# Patient Record
Sex: FEMALE | URBAN - METROPOLITAN AREA
[De-identification: names, ages, dates, MRNs, and addresses within clinical notes are randomized per-mention and may not be internally consistent; named-entity substitution may affect disease eponyms.]

---

## 2020-12-04 ENCOUNTER — INPATIENT (INPATIENT)
Facility: HOSPITAL | Age: 85
LOS: 6 days | End: 2020-12-11
Attending: HOSPITALIST | Admitting: HOSPITALIST
Payer: MEDICARE

## 2020-12-04 VITALS
TEMPERATURE: 98 F | SYSTOLIC BLOOD PRESSURE: 103 MMHG | DIASTOLIC BLOOD PRESSURE: 53 MMHG | RESPIRATION RATE: 18 BRPM | HEART RATE: 81 BPM | OXYGEN SATURATION: 99 %

## 2020-12-04 DIAGNOSIS — Z95.1 PRESENCE OF AORTOCORONARY BYPASS GRAFT: Chronic | ICD-10-CM

## 2020-12-04 DIAGNOSIS — J18.9 PNEUMONIA, UNSPECIFIED ORGANISM: ICD-10-CM

## 2020-12-04 LAB
ALBUMIN SERPL ELPH-MCNC: 2.8 G/DL — LOW (ref 3.3–5)
ALP SERPL-CCNC: 79 U/L — SIGNIFICANT CHANGE UP (ref 40–120)
ALT FLD-CCNC: 11 U/L — SIGNIFICANT CHANGE UP (ref 4–33)
ANION GAP SERPL CALC-SCNC: 19 MMO/L — HIGH (ref 7–14)
ANION GAP SERPL CALC-SCNC: 21 MMO/L — HIGH (ref 7–14)
APPEARANCE UR: CLEAR — SIGNIFICANT CHANGE UP
AST SERPL-CCNC: 20 U/L — SIGNIFICANT CHANGE UP (ref 4–32)
BACTERIA # UR AUTO: NEGATIVE — SIGNIFICANT CHANGE UP
BASOPHILS # BLD AUTO: 0.08 K/UL — SIGNIFICANT CHANGE UP (ref 0–0.2)
BASOPHILS NFR BLD AUTO: 0.6 % — SIGNIFICANT CHANGE UP (ref 0–2)
BILIRUB SERPL-MCNC: 0.3 MG/DL — SIGNIFICANT CHANGE UP (ref 0.2–1.2)
BILIRUB UR-MCNC: NEGATIVE — SIGNIFICANT CHANGE UP
BLOOD UR QL VISUAL: SIGNIFICANT CHANGE UP
BUN SERPL-MCNC: 60 MG/DL — HIGH (ref 7–23)
BUN SERPL-MCNC: 65 MG/DL — HIGH (ref 7–23)
CALCIUM SERPL-MCNC: 10.2 MG/DL — SIGNIFICANT CHANGE UP (ref 8.4–10.5)
CALCIUM SERPL-MCNC: 9.3 MG/DL — SIGNIFICANT CHANGE UP (ref 8.4–10.5)
CHLORIDE SERPL-SCNC: 100 MMOL/L — SIGNIFICANT CHANGE UP (ref 98–107)
CHLORIDE SERPL-SCNC: 101 MMOL/L — SIGNIFICANT CHANGE UP (ref 98–107)
CK MB BLD-MCNC: 6.12 NG/ML — HIGH (ref 1–4.7)
CK MB BLD-MCNC: SIGNIFICANT CHANGE UP (ref 0–2.5)
CK SERPL-CCNC: 69 U/L — SIGNIFICANT CHANGE UP (ref 25–170)
CO2 SERPL-SCNC: 16 MMOL/L — LOW (ref 22–31)
CO2 SERPL-SCNC: 16 MMOL/L — LOW (ref 22–31)
COLOR SPEC: YELLOW — SIGNIFICANT CHANGE UP
CREAT SERPL-MCNC: 1.03 MG/DL — SIGNIFICANT CHANGE UP (ref 0.5–1.3)
CREAT SERPL-MCNC: 1.13 MG/DL — SIGNIFICANT CHANGE UP (ref 0.5–1.3)
EOSINOPHIL # BLD AUTO: 0.1 K/UL — SIGNIFICANT CHANGE UP (ref 0–0.5)
EOSINOPHIL NFR BLD AUTO: 0.7 % — SIGNIFICANT CHANGE UP (ref 0–6)
GLUCOSE SERPL-MCNC: 123 MG/DL — HIGH (ref 70–99)
GLUCOSE SERPL-MCNC: 156 MG/DL — HIGH (ref 70–99)
GLUCOSE UR-MCNC: NEGATIVE — SIGNIFICANT CHANGE UP
HCT VFR BLD CALC: 41.9 % — SIGNIFICANT CHANGE UP (ref 34.5–45)
HGB BLD-MCNC: 13.2 G/DL — SIGNIFICANT CHANGE UP (ref 11.5–15.5)
HYALINE CASTS # UR AUTO: NEGATIVE — SIGNIFICANT CHANGE UP
IMM GRANULOCYTES NFR BLD AUTO: 1.8 % — HIGH (ref 0–1.5)
KETONES UR-MCNC: SIGNIFICANT CHANGE UP
LEUKOCYTE ESTERASE UR-ACNC: SIGNIFICANT CHANGE UP
LYMPHOCYTES # BLD AUTO: 0.69 K/UL — LOW (ref 1–3.3)
LYMPHOCYTES # BLD AUTO: 5.1 % — LOW (ref 13–44)
MCHC RBC-ENTMCNC: 27.8 PG — SIGNIFICANT CHANGE UP (ref 27–34)
MCHC RBC-ENTMCNC: 31.5 % — LOW (ref 32–36)
MCV RBC AUTO: 88.4 FL — SIGNIFICANT CHANGE UP (ref 80–100)
MONOCYTES # BLD AUTO: 1.45 K/UL — HIGH (ref 0–0.9)
MONOCYTES NFR BLD AUTO: 10.6 % — SIGNIFICANT CHANGE UP (ref 2–14)
NEUTROPHILS # BLD AUTO: 11.08 K/UL — HIGH (ref 1.8–7.4)
NEUTROPHILS NFR BLD AUTO: 81.2 % — HIGH (ref 43–77)
NITRITE UR-MCNC: NEGATIVE — SIGNIFICANT CHANGE UP
NRBC # FLD: 0 K/UL — SIGNIFICANT CHANGE UP (ref 0–0)
NT-PROBNP SERPL-SCNC: 7007 PG/ML — SIGNIFICANT CHANGE UP
PH UR: 5.5 — SIGNIFICANT CHANGE UP (ref 5–8)
PLATELET # BLD AUTO: 282 K/UL — SIGNIFICANT CHANGE UP (ref 150–400)
PMV BLD: 10.2 FL — SIGNIFICANT CHANGE UP (ref 7–13)
POTASSIUM SERPL-MCNC: 4.1 MMOL/L — SIGNIFICANT CHANGE UP (ref 3.5–5.3)
POTASSIUM SERPL-MCNC: 5 MMOL/L — SIGNIFICANT CHANGE UP (ref 3.5–5.3)
POTASSIUM SERPL-SCNC: 4.1 MMOL/L — SIGNIFICANT CHANGE UP (ref 3.5–5.3)
POTASSIUM SERPL-SCNC: 5 MMOL/L — SIGNIFICANT CHANGE UP (ref 3.5–5.3)
PROT SERPL-MCNC: 6.3 G/DL — SIGNIFICANT CHANGE UP (ref 6–8.3)
PROT UR-MCNC: 30 — SIGNIFICANT CHANGE UP
RBC # BLD: 4.74 M/UL — SIGNIFICANT CHANGE UP (ref 3.8–5.2)
RBC # FLD: 13.8 % — SIGNIFICANT CHANGE UP (ref 10.3–14.5)
RBC CASTS # UR COMP ASSIST: SIGNIFICANT CHANGE UP (ref 0–?)
SARS-COV-2 RNA SPEC QL NAA+PROBE: SIGNIFICANT CHANGE UP
SODIUM SERPL-SCNC: 136 MMOL/L — SIGNIFICANT CHANGE UP (ref 135–145)
SODIUM SERPL-SCNC: 137 MMOL/L — SIGNIFICANT CHANGE UP (ref 135–145)
SP GR SPEC: 1.02 — SIGNIFICANT CHANGE UP (ref 1–1.04)
SQUAMOUS # UR AUTO: SIGNIFICANT CHANGE UP
TROPONIN T, HIGH SENSITIVITY: 77 NG/L — CRITICAL HIGH (ref ?–14)
TROPONIN T, HIGH SENSITIVITY: 82 NG/L — CRITICAL HIGH (ref ?–14)
TROPONIN T, HIGH SENSITIVITY: 85 NG/L — CRITICAL HIGH (ref ?–14)
TSH SERPL-MCNC: 1.6 UIU/ML — SIGNIFICANT CHANGE UP (ref 0.27–4.2)
UROBILINOGEN FLD QL: SIGNIFICANT CHANGE UP
WBC # BLD: 13.65 K/UL — HIGH (ref 3.8–10.5)
WBC # FLD AUTO: 13.65 K/UL — HIGH (ref 3.8–10.5)
WBC UR QL: HIGH (ref 0–?)

## 2020-12-04 PROCEDURE — 70450 CT HEAD/BRAIN W/O DYE: CPT | Mod: 26

## 2020-12-04 PROCEDURE — 71045 X-RAY EXAM CHEST 1 VIEW: CPT | Mod: 26

## 2020-12-04 PROCEDURE — 99285 EMERGENCY DEPT VISIT HI MDM: CPT

## 2020-12-04 RX ORDER — LISINOPRIL 2.5 MG/1
10 TABLET ORAL DAILY
Refills: 0 | Status: DISCONTINUED | OUTPATIENT
Start: 2020-12-04 | End: 2020-12-06

## 2020-12-04 RX ORDER — CEFTRIAXONE 500 MG/1
1000 INJECTION, POWDER, FOR SOLUTION INTRAMUSCULAR; INTRAVENOUS EVERY 24 HOURS
Refills: 0 | Status: DISCONTINUED | OUTPATIENT
Start: 2020-12-05 | End: 2020-12-06

## 2020-12-04 RX ORDER — SODIUM CHLORIDE 9 MG/ML
1000 INJECTION, SOLUTION INTRAVENOUS
Refills: 0 | Status: DISCONTINUED | OUTPATIENT
Start: 2020-12-04 | End: 2020-12-11

## 2020-12-04 RX ORDER — DEXTROSE 50 % IN WATER 50 %
25 SYRINGE (ML) INTRAVENOUS ONCE
Refills: 0 | Status: DISCONTINUED | OUTPATIENT
Start: 2020-12-04 | End: 2020-12-11

## 2020-12-04 RX ORDER — ASPIRIN/CALCIUM CARB/MAGNESIUM 324 MG
81 TABLET ORAL DAILY
Refills: 0 | Status: DISCONTINUED | OUTPATIENT
Start: 2020-12-04 | End: 2020-12-05

## 2020-12-04 RX ORDER — INSULIN LISPRO 100/ML
VIAL (ML) SUBCUTANEOUS
Refills: 0 | Status: DISCONTINUED | OUTPATIENT
Start: 2020-12-04 | End: 2020-12-11

## 2020-12-04 RX ORDER — ASPIRIN/CALCIUM CARB/MAGNESIUM 324 MG
162 TABLET ORAL ONCE
Refills: 0 | Status: COMPLETED | OUTPATIENT
Start: 2020-12-04 | End: 2020-12-04

## 2020-12-04 RX ORDER — METOPROLOL TARTRATE 50 MG
50 TABLET ORAL
Refills: 0 | Status: DISCONTINUED | OUTPATIENT
Start: 2020-12-04 | End: 2020-12-10

## 2020-12-04 RX ORDER — AZITHROMYCIN 500 MG/1
500 TABLET, FILM COATED ORAL ONCE
Refills: 0 | Status: COMPLETED | OUTPATIENT
Start: 2020-12-04 | End: 2020-12-04

## 2020-12-04 RX ORDER — AZITHROMYCIN 500 MG/1
500 TABLET, FILM COATED ORAL EVERY 24 HOURS
Refills: 0 | Status: DISCONTINUED | OUTPATIENT
Start: 2020-12-05 | End: 2020-12-06

## 2020-12-04 RX ORDER — GLUCAGON INJECTION, SOLUTION 0.5 MG/.1ML
1 INJECTION, SOLUTION SUBCUTANEOUS ONCE
Refills: 0 | Status: DISCONTINUED | OUTPATIENT
Start: 2020-12-04 | End: 2020-12-11

## 2020-12-04 RX ORDER — DEXTROSE 50 % IN WATER 50 %
12.5 SYRINGE (ML) INTRAVENOUS ONCE
Refills: 0 | Status: DISCONTINUED | OUTPATIENT
Start: 2020-12-04 | End: 2020-12-11

## 2020-12-04 RX ORDER — SODIUM CHLORIDE 9 MG/ML
3 INJECTION INTRAMUSCULAR; INTRAVENOUS; SUBCUTANEOUS EVERY 8 HOURS
Refills: 0 | Status: DISCONTINUED | OUTPATIENT
Start: 2020-12-04 | End: 2020-12-06

## 2020-12-04 RX ORDER — ACETAMINOPHEN 500 MG
650 TABLET ORAL EVERY 6 HOURS
Refills: 0 | Status: DISCONTINUED | OUTPATIENT
Start: 2020-12-04 | End: 2020-12-11

## 2020-12-04 RX ORDER — SODIUM CHLORIDE 9 MG/ML
1000 INJECTION INTRAMUSCULAR; INTRAVENOUS; SUBCUTANEOUS ONCE
Refills: 0 | Status: COMPLETED | OUTPATIENT
Start: 2020-12-04 | End: 2020-12-04

## 2020-12-04 RX ORDER — DEXTROSE 50 % IN WATER 50 %
15 SYRINGE (ML) INTRAVENOUS ONCE
Refills: 0 | Status: DISCONTINUED | OUTPATIENT
Start: 2020-12-04 | End: 2020-12-11

## 2020-12-04 RX ORDER — ENOXAPARIN SODIUM 100 MG/ML
40 INJECTION SUBCUTANEOUS DAILY
Refills: 0 | Status: DISCONTINUED | OUTPATIENT
Start: 2020-12-04 | End: 2020-12-04

## 2020-12-04 RX ORDER — ENOXAPARIN SODIUM 100 MG/ML
30 INJECTION SUBCUTANEOUS DAILY
Refills: 0 | Status: DISCONTINUED | OUTPATIENT
Start: 2020-12-04 | End: 2020-12-11

## 2020-12-04 RX ORDER — LACTOBACILLUS ACIDOPHILUS 100MM CELL
1 CAPSULE ORAL
Refills: 0 | Status: DISCONTINUED | OUTPATIENT
Start: 2020-12-04 | End: 2020-12-11

## 2020-12-04 RX ORDER — ASPIRIN/CALCIUM CARB/MAGNESIUM 324 MG
325 TABLET ORAL DAILY
Refills: 0 | Status: DISCONTINUED | OUTPATIENT
Start: 2020-12-04 | End: 2020-12-04

## 2020-12-04 RX ORDER — INSULIN LISPRO 100/ML
VIAL (ML) SUBCUTANEOUS AT BEDTIME
Refills: 0 | Status: DISCONTINUED | OUTPATIENT
Start: 2020-12-04 | End: 2020-12-11

## 2020-12-04 RX ORDER — SIMVASTATIN 20 MG/1
40 TABLET, FILM COATED ORAL AT BEDTIME
Refills: 0 | Status: DISCONTINUED | OUTPATIENT
Start: 2020-12-04 | End: 2020-12-11

## 2020-12-04 RX ORDER — CEFTRIAXONE 500 MG/1
1000 INJECTION, POWDER, FOR SOLUTION INTRAMUSCULAR; INTRAVENOUS ONCE
Refills: 0 | Status: COMPLETED | OUTPATIENT
Start: 2020-12-04 | End: 2020-12-04

## 2020-12-04 RX ADMIN — SODIUM CHLORIDE 2000 MILLILITER(S): 9 INJECTION INTRAMUSCULAR; INTRAVENOUS; SUBCUTANEOUS at 15:36

## 2020-12-04 RX ADMIN — AZITHROMYCIN 255 MILLIGRAM(S): 500 TABLET, FILM COATED ORAL at 19:25

## 2020-12-04 RX ADMIN — SIMVASTATIN 40 MILLIGRAM(S): 20 TABLET, FILM COATED ORAL at 21:31

## 2020-12-04 RX ADMIN — CEFTRIAXONE 100 MILLIGRAM(S): 500 INJECTION, POWDER, FOR SOLUTION INTRAMUSCULAR; INTRAVENOUS at 19:48

## 2020-12-04 RX ADMIN — Medication 162 MILLIGRAM(S): at 19:47

## 2020-12-04 RX ADMIN — Medication 0: at 21:30

## 2020-12-04 RX ADMIN — SODIUM CHLORIDE 3 MILLILITER(S): 9 INJECTION INTRAMUSCULAR; INTRAVENOUS; SUBCUTANEOUS at 21:31

## 2020-12-04 NOTE — H&P ADULT - NEGATIVE OPHTHALMOLOGIC SYMPTOMS
no photophobia/no lacrimation L/no lacrimation R/no blurred vision L/no blurred vision R/no discharge L/no discharge R

## 2020-12-04 NOTE — ED PROVIDER NOTE - OBJECTIVE STATEMENT
89 yo F with DM HTN, CAD s/p triple bypass, coming from home, lives alone,  presents with weakness, malaise and decreased appetitive for the last 4 days. Denies abdominal pain, nausea, vomiting, chest pain, sob, cough, fever, urinary symptoms, nasal congestion, loss of taste or smell. Reports mild lower back pain but no urinary or bowel incontinence. Ambulatory. Last bowel movement has 2 days ago, passing gas. No falls, no new medication. Noted to have asymmetrical pupils at triage. Denies head trauma or hx of asymmetric pupils in the past. Patient is answering questions appropriately. Ambulatory.

## 2020-12-04 NOTE — ED ADULT NURSE NOTE - NSIMPLEMENTINTERV_GEN_ALL_ED
Implemented All Fall with Harm Risk Interventions:  [place] to call system. Call bell, personal items and telephone within reach. Instruct patient to call for assistance. Room bathroom lighting operational. Non-slip footwear when patient is off stretcher. Physically safe environment: no spills, clutter or unnecessary equipment. Stretcher in lowest position, wheels locked, appropriate side rails in place. Provide visual cue, wrist band, yellow gown, etc. Monitor gait and stability. Monitor for mental status changes and reorient to person, place, and time. Review medications for side effects contributing to fall risk. Reinforce activity limits and safety measures with patient and family. Provide visual clues: red socks.

## 2020-12-04 NOTE — H&P ADULT - NEGATIVE ENMT SYMPTOMS
no ear pain/no tinnitus/no vertigo/no sinus symptoms/no nasal congestion/no nasal discharge/no nasal obstruction/no post-nasal discharge/no nose bleeds

## 2020-12-04 NOTE — H&P ADULT - PROBLEM SELECTOR PLAN 4
F/U repeat BMP @ 10P   s/p IVF with 1L  Pt had positive, unmeasured urine out put. @65.   Will decrease  mg to 81 mg po daily for now.   Pt given IVF Bolus with NS x 1 L.  F/U VBG and lactate. Concern for salicylate toxicity. Check salicylate level  Will hold aspirin for now  Pt given IVF Bolus with NS x 1 L.  F/U VBG and lactate.

## 2020-12-04 NOTE — ED ADULT TRIAGE NOTE - CHIEF COMPLAINT QUOTE
Patient brought to ER by EMS from home after she called her daughter in [place] concerning for not feeling well for the past few days and not eating. Pt has hx of DM, HTN, Triple Bypass.  . Pt has one pinpoint pupil and is alert and oriented times four.

## 2020-12-04 NOTE — H&P ADULT - PROBLEM SELECTOR PLAN 1
Trop 85 decreasing to 77. Chest pain free.  Cardiac monitor.  F/U #3 CE.  Give O2, ASA, BB, ACE, Statin. Trop 85 decreasing to 77. Chest pain free.  Cardiac monitor.  F/U #3 CE.  Give O2, ASA, BB, ACE, Statin.  Cardiology consult in AM

## 2020-12-04 NOTE — H&P ADULT - PROBLEM SELECTOR PLAN 2
Incentive spirometry.  Continue Ceftriaxone and Zithromax IV.  lactobacillus for bruce karen protection.   Tylenol PRN. Incentive spirometry.  Continue Ceftriaxone and Zithromax IV.  lactobacillus for bruce karen protection.   Check urine legionella   Tylenol PRN.

## 2020-12-04 NOTE — ED ADULT NURSE NOTE - OBJECTIVE STATEMENT
A&Ox4. Pt. states that for 4 days she's been having weakness, loss of appetite and lower back aching. Pt. denies having any recent falls, N/V/D, SOB, fevers, difficulty with urination, CP or dizziness. Pt. states that she's able to perform ADLs "with difficulty" since she began feeling weak. Pt. able to reposition herself slowly and needing some assistance. Scattered purple bruising noted to bilateral shins. Healed circular area noted to left inner ankle. Skin intact otherwise. VSS. NSR on cardiac monitor.

## 2020-12-04 NOTE — H&P ADULT - NSHPLABSRESULTS_GEN_ALL_CORE
CT HEAD; No evidence of acute intracranial abnormality.  No evidence of hemorrhage.    CXR Preliminary:  Bilateral lung hazy opacities likely of infectious etiology.    UA : Small Leuk Estace.     Trop 85> 77.  EKG NSR @ 85b/ min, LVH, TWI 1,2, AVL, V4 V5, V6, Qt/ Qtc= 370/ 440. No prior EKG on file.  TSH = 1.6.                         13.2   13.65 )-----------( 282      ( 04 Dec 2020 14:34 )             41.9   12-04    137  |  100  |  65<H>  ----------------------------<  123<H>  5.0   |  16<L>  |  1.13    Ca    10.2      04 Dec 2020 14:34    TPro  6.3  /  Alb  2.8<L>  /  TBili  0.3  /  DBili  x   /  AST  20  /  ALT  11  /  AlkPhos  79  12-04 CT HEAD; No evidence of acute intracranial abnormality.  No evidence of hemorrhage.    CXR Preliminary:  Bilateral lung hazy opacities likely of infectious etiology.    UA : Small Leuk Estace.     Trop 85> 77.  EKG NSR @ 85b/ min, LVH, TWI 1,2, AVL, V4 V5, V6, Qt/ Qtc= 370/ 440. No prior EKG on file.  TSH = 1.6.   COVID PCR Negative                         13.2   13.65 )-----------( 282      ( 04 Dec 2020 14:34 )             41.9   12-04    137  |  100  |  65<H>  ----------------------------<  123<H>  5.0   |  16<L>  |  1.13    Ca    10.2      04 Dec 2020 14:34    TPro  6.3  /  Alb  2.8<L>  /  TBili  0.3  /  DBili  x   /  AST  20  /  ALT  11  /  AlkPhos  79  12-04

## 2020-12-04 NOTE — H&P ADULT - PROBLEM SELECTOR PLAN 8
VTE with Lovenox 40mg sub cut daily.  Fall, Aspiration, safety precautios.  PT and SW eval. VTE with Lovenox 30mg sub cut daily.  Fall, Aspiration, safety precautios.  PT and SW eval. F/U Lipid panel.  Continue Statin.

## 2020-12-04 NOTE — ED PROVIDER NOTE - NS ED ROS FT
GENERAL: see hpi  EYES: no change in vision  HEENT: no trouble swallowing or speaking  CARDIAC: no chest pain or palpiations   PULMONARY: no cough or SOB  GI: no abdominal pain, nausea, vomiting, diarrhea, or constipation   : No changes in urination  SKIN: no rashes  NEURO: no headache or numbness. + weakness.  MSK: No joint pain GENERAL: see hpi  EYES: no change in vision  HEENT: no trouble swallowing or speaking  CARDIAC: no chest pain or palpitations   PULMONARY: no cough or SOB  GI: no abdominal pain, nausea, vomiting, diarrhea, or constipation   : No changes in urination  SKIN: no rashes  NEURO: no headache or numbness. + weakness.  MSK: No joint pain

## 2020-12-04 NOTE — H&P ADULT - NEGATIVE NEUROLOGICAL SYMPTOMS
no transient paralysis/no weakness/no paresthesias/no generalized seizures/no focal seizures/no syncope/no tremors/no vertigo/no loss of sensation/no difficulty walking/no loss of consciousness/no hemiparesis/no confusion/no facial palsy

## 2020-12-04 NOTE — H&P ADULT - PROBLEM SELECTOR PLAN 3
@65.   Pt given IVF Bolus with NS x 1 L.  F/U VBG and lactate. @65.   Will decrease  mg to 81 mg po daily for now.   Pt given IVF Bolus with NS x 1 L.  F/U VBG and lactate. Generalized weakness, exertional lumbar back pain with 1 episode of urinary incontinence. Less likely due to chord compression since the pt only had 1 episode of urinary incontinence. No episodes of bowel incontinence or b/l LE weakness. Less likely stroke due to CT head with no acute findings and no neurologic deficits. Most likely due to community acquired PNA with leukocytosis and preliminary CXR finding. Generalized weakness, exertional lumbar back pain with 1 episode of urinary incontinence. Less likely due to cord compression since the pt only had 1 episode of urinary incontinence. No episodes of bowel incontinence or b/l LE weakness. Less likely stroke due to CT head with no acute findings and no neurologic deficits. Most likely due to community acquired PNA with leukocytosis and preliminary CXR finding.

## 2020-12-04 NOTE — H&P ADULT - PROBLEM SELECTOR PLAN 10
1.  Name of PCP: Andre Kearns   2.  PCP Contacted on Admission: [ ] Y    [X] N    3.  PCP contacted at Discharge: [ ] Y    [ ] N    [ ] N/A  4.  Post-Discharge Appointment Date and Location:  5.  Summary of Handoff given to PCP:

## 2020-12-04 NOTE — ED PROVIDER NOTE - ATTENDING CONTRIBUTION TO CARE
Pt was seen and evaluated by me. Pt is a 89 y/o female with PMHx of DM type 2, HTN, and CAD s/p bypass who presented to the ED for weakness and decreased appetite X 4 days. Pt states over the past 4 days having increased weakness and decreased PO intake. Pt denies any headache, neck pain, back pain, fever, chills, nausea, vomiting, SOB, chest pain, or abd pain. Pt notes to have asymmetric pupils at triage. Lungs CTA b/l. RRR. Abd soft, non-tender. 5/5 b/l UE and LE. Right pupil 4mm non-reactive. Left 3mm, reactive.  Concern for ICM, ACS, UTI, Viral URI  Labs, EKG, CT

## 2020-12-04 NOTE — H&P ADULT - PROBLEM SELECTOR PLAN 5
F/U A1C.  FS QID  ISS.  Diabetic diet. F/U repeat BMP @ 10P   s/p IVF with 1L  Pt had positive, unmeasured urine out put.

## 2020-12-04 NOTE — H&P ADULT - PROBLEM SELECTOR PLAN 9
1.  Name of PCP: Andre Kearns   2.  PCP Contacted on Admission: [ ] Y    [X] N    3.  PCP contacted at Discharge: [ ] Y    [ ] N    [ ] N/A  4.  Post-Discharge Appointment Date and Location:  5.  Summary of Handoff given to PCP: VTE with Lovenox 30mg sub cut daily.  Fall, Aspiration, safety precautios.  PT and SW eval.

## 2020-12-04 NOTE — H&P ADULT - NSICDXPASTMEDICALHX_GEN_ALL_CORE_FT
PAST MEDICAL HISTORY:  CAD (coronary artery disease)     DM (diabetes mellitus)     HTN (hypertension)     Hyperlipidemia

## 2020-12-04 NOTE — ED PROVIDER NOTE - CLINICAL SUMMARY MEDICAL DECISION MAKING FREE TEXT BOX
89 yo F with DM HTN, CAD s/p triple bypass, coming from home, lives alone,  presents with weakness, malaise and decreased appetitive for the last 4 days. VS WNL. Clear lungs, abd slightly distended but soft and non-tender. Asymmetrical pupils but no signs of trauma. Moving all extremities. Will get basic labs, UA, UC, cardiac work up, head CT, and likely admit

## 2020-12-04 NOTE — H&P ADULT - ATTENDING COMMENTS
89F PMH CAD s/p 3VCABG 2005 @ [facility], HTN, Hyperlipidemia, DM2 p/w generalized weakness. Pt reports a few days of fatigue and paraspinal lumbosacral back pain. She otherwise denies feeling short of breath, denies cough, chest pain or fevers. Labs notable for elevated troponins, respiratory alkalosis with high anion gap metabolic acidosis. CXR with bilateral hazy opacities. Pro-bnp elevated. Pt is on 325 of aspirin, unclear why. Given acid base status, concern for chronic aspirin toxicity vs 2/2 uremia. Otherwise normal lactate, no signs of DKA. Will need to check salicylate level, hold aspirin for now. Pt clinically without signs of PNA, no fevers or cough although with a white count. Will treat empirically for now with CTX and azithro. Check urine legionella. RVP. Concern also for volume overload vs pulm edema 2/2 to salicylate toxicity. Check CT chest to further evaluate lung parenchyma. Trops uptrending with mildly elevated CKMB. Pt denies chest pain. EKG with possibly small St depressions. This may be secondary to demand ischemia however given cardiac history, will need Cards eval. TTE pending. 89F PMH CAD s/p 3VCABG 2005 @ [facility], HTN, Hyperlipidemia, DM2 p/w generalized weakness. Pt reports a few days of fatigue and paraspinal lumbosacral back pain. No focal weakness, ambulating without assistance. She otherwise denies feeling short of breath, denies cough, chest pain or fevers. Labs notable for elevated troponins, respiratory alkalosis with high anion gap metabolic acidosis. CXR with bilateral hazy opacities. Pro-bnp elevated. Pt is on 325 of aspirin, unclear why. Given acid base status, concern for chronic aspirin toxicity vs 2/2 uremia. Otherwise normal lactate, no signs of DKA. Will need to check salicylate level, hold aspirin for now. Pt clinically without signs of PNA, no fevers or cough although with a white count. Will treat empirically for now with CTX and azithro. Check urine legionella. RVP. Concern also for volume overload vs pulm edema 2/2 to salicylate toxicity. Check CT chest to further evaluate lung parenchyma. Trops uptrending with mildly elevated CKMB. Pt denies chest pain. EKG with possibly small St depressions. This may be secondary to demand ischemia however given cardiac history, will need Cards eval. TTE pending.

## 2020-12-04 NOTE — H&P ADULT - ASSESSMENT
89F history of CAD s/p 3V CABG, Dm2, HTN, Hyperlipidemia, admitted for elevated Troponins, leukocytosis, likely due to preliminary CXR revealing b/l lung hazy opacities likely of infectious etiology. elevated anion gap and BUN.

## 2020-12-04 NOTE — H&P ADULT - HISTORY OF PRESENT ILLNESS
89F, self ambulating, HO H, history of CAD s/p 3VCABG 2005 @ [facility], HTN, Hyperlipidemia, DM2, experienced generalized weakness with intermittent Frontal HA, last felt 12/3, intermittent, atraumatic, exertional, low back pain, relived with rest, last felt 12/4 and 1 episode of urinary incontinence 12/3 evening, that prompted her to come to the ED. Denies dizziness, falls, blurry vision, SOB, cough, chest pain, DANG, nausea, vomit, diarrhea, constipation, abdominal pain, bowel incontinence, chills, diaphoresis, dysuria, COVID exposure.     In the Ed, the pt was started on Ceftriaxone and Zithromax IV for leukocytosis and preliminary CXR revealing b/l opacities.   Trop 85> 77  EKG NSR @ 85b/ min, LVH, TWI 1,2, AVL, V4 V5, V6, Qt/ Qtc= 370/ 440. No prior EKG on file.     Vitals: T max= 98* oral, HR= 91b/min, BP = 117/ 85, RR= 17b/ min, SPo2= 100% ra

## 2020-12-04 NOTE — H&P ADULT - NSHPSOCIALHISTORY_GEN_ALL_CORE
.  Lives alone.  Retired; .     Quit Nicotine 1995 after 25 pack years.   ETOH < 1x a month.  Denies Illicit/ recreational drugs.  Mammogram > 20 years: Normal  Colonoscopy 2010: Normal.  Flu vac: 11/2020.

## 2020-12-05 LAB
ANION GAP SERPL CALC-SCNC: 19 MMO/L — HIGH (ref 7–14)
BASE EXCESS BLDV CALC-SCNC: -8.2 MMOL/L — SIGNIFICANT CHANGE UP
BLOOD GAS VENOUS - CREATININE: 1.15 MG/DL — SIGNIFICANT CHANGE UP (ref 0.5–1.3)
BLOOD GAS VENOUS - FIO2: 21 — SIGNIFICANT CHANGE UP
BUN SERPL-MCNC: 61 MG/DL — HIGH (ref 7–23)
CALCIUM SERPL-MCNC: 9.5 MG/DL — SIGNIFICANT CHANGE UP (ref 8.4–10.5)
CHLORIDE BLDV-SCNC: 108 MMOL/L — SIGNIFICANT CHANGE UP (ref 96–108)
CHLORIDE SERPL-SCNC: 103 MMOL/L — SIGNIFICANT CHANGE UP (ref 98–107)
CHOLEST SERPL-MCNC: 88 MG/DL — LOW (ref 120–199)
CK MB BLD-MCNC: 6.17 NG/ML — HIGH (ref 1–4.7)
CK SERPL-CCNC: 63 U/L — SIGNIFICANT CHANGE UP (ref 25–170)
CO2 SERPL-SCNC: 14 MMOL/L — LOW (ref 22–31)
CREAT SERPL-MCNC: 1.09 MG/DL — SIGNIFICANT CHANGE UP (ref 0.5–1.3)
CULTURE RESULTS: SIGNIFICANT CHANGE UP
DIRECT LDL: 34 MG/DL — SIGNIFICANT CHANGE UP
GAS PNL BLDV: 135 MMOL/L — LOW (ref 136–146)
GLUCOSE BLDV-MCNC: 124 MG/DL — HIGH (ref 70–99)
GLUCOSE SERPL-MCNC: 124 MG/DL — HIGH (ref 70–99)
HBA1C BLD-MCNC: 6.5 % — HIGH (ref 4–5.6)
HCO3 BLDV-SCNC: 18 MMOL/L — LOW (ref 20–27)
HCT VFR BLD CALC: 36.7 % — SIGNIFICANT CHANGE UP (ref 34.5–45)
HCT VFR BLDV CALC: 39.3 % — SIGNIFICANT CHANGE UP (ref 34.5–45)
HDLC SERPL-MCNC: 28 MG/DL — LOW (ref 45–65)
HGB BLD-MCNC: 11.9 G/DL — SIGNIFICANT CHANGE UP (ref 11.5–15.5)
HGB BLDV-MCNC: 12.8 G/DL — SIGNIFICANT CHANGE UP (ref 11.5–15.5)
LACTATE BLDV-MCNC: 2 MMOL/L — SIGNIFICANT CHANGE UP (ref 0.5–2)
MAGNESIUM SERPL-MCNC: 1.3 MG/DL — LOW (ref 1.6–2.6)
MCHC RBC-ENTMCNC: 28.2 PG — SIGNIFICANT CHANGE UP (ref 27–34)
MCHC RBC-ENTMCNC: 32.4 % — SIGNIFICANT CHANGE UP (ref 32–36)
MCV RBC AUTO: 87 FL — SIGNIFICANT CHANGE UP (ref 80–100)
NRBC # FLD: 0 K/UL — SIGNIFICANT CHANGE UP (ref 0–0)
PCO2 BLDV: 27 MMHG — LOW (ref 41–51)
PH BLDV: 7.38 PH — SIGNIFICANT CHANGE UP (ref 7.32–7.43)
PHOSPHATE SERPL-MCNC: 3.3 MG/DL — SIGNIFICANT CHANGE UP (ref 2.5–4.5)
PLATELET # BLD AUTO: 297 K/UL — SIGNIFICANT CHANGE UP (ref 150–400)
PMV BLD: 9.6 FL — SIGNIFICANT CHANGE UP (ref 7–13)
PO2 BLDV: 99 MMHG — HIGH (ref 35–40)
POTASSIUM BLDV-SCNC: 4 MMOL/L — SIGNIFICANT CHANGE UP (ref 3.4–4.5)
POTASSIUM SERPL-MCNC: 4.4 MMOL/L — SIGNIFICANT CHANGE UP (ref 3.5–5.3)
POTASSIUM SERPL-SCNC: 4.4 MMOL/L — SIGNIFICANT CHANGE UP (ref 3.5–5.3)
RBC # BLD: 4.22 M/UL — SIGNIFICANT CHANGE UP (ref 3.8–5.2)
RBC # FLD: 13.9 % — SIGNIFICANT CHANGE UP (ref 10.3–14.5)
SALICYLATES SERPL-MCNC: <5 MG/DL — LOW (ref 15–30)
SAO2 % BLDV: 96.7 % — HIGH (ref 60–85)
SARS-COV-2 RNA SPEC QL NAA+PROBE: SIGNIFICANT CHANGE UP
SODIUM SERPL-SCNC: 136 MMOL/L — SIGNIFICANT CHANGE UP (ref 135–145)
SPECIMEN SOURCE: SIGNIFICANT CHANGE UP
TRIGL SERPL-MCNC: 123 MG/DL — SIGNIFICANT CHANGE UP (ref 10–149)
TROPONIN T, HIGH SENSITIVITY: 94 NG/L — CRITICAL HIGH (ref ?–14)
WBC # BLD: 13.98 K/UL — HIGH (ref 3.8–10.5)
WBC # FLD AUTO: 13.98 K/UL — HIGH (ref 3.8–10.5)

## 2020-12-05 PROCEDURE — 99223 1ST HOSP IP/OBS HIGH 75: CPT | Mod: GC

## 2020-12-05 PROCEDURE — 99233 SBSQ HOSP IP/OBS HIGH 50: CPT

## 2020-12-05 RX ORDER — MAGNESIUM SULFATE 500 MG/ML
2 VIAL (ML) INJECTION ONCE
Refills: 0 | Status: COMPLETED | OUTPATIENT
Start: 2020-12-05 | End: 2020-12-05

## 2020-12-05 RX ADMIN — Medication 2: at 13:30

## 2020-12-05 RX ADMIN — AZITHROMYCIN 255 MILLIGRAM(S): 500 TABLET, FILM COATED ORAL at 06:03

## 2020-12-05 RX ADMIN — ENOXAPARIN SODIUM 30 MILLIGRAM(S): 100 INJECTION SUBCUTANEOUS at 11:34

## 2020-12-05 RX ADMIN — Medication 1 TABLET(S): at 06:03

## 2020-12-05 RX ADMIN — Medication 1 TABLET(S): at 21:40

## 2020-12-05 RX ADMIN — SIMVASTATIN 40 MILLIGRAM(S): 20 TABLET, FILM COATED ORAL at 21:40

## 2020-12-05 RX ADMIN — SODIUM CHLORIDE 3 MILLILITER(S): 9 INJECTION INTRAMUSCULAR; INTRAVENOUS; SUBCUTANEOUS at 06:04

## 2020-12-05 RX ADMIN — CEFTRIAXONE 100 MILLIGRAM(S): 500 INJECTION, POWDER, FOR SOLUTION INTRAMUSCULAR; INTRAVENOUS at 11:34

## 2020-12-05 RX ADMIN — Medication 2: at 08:48

## 2020-12-05 RX ADMIN — Medication 50 GRAM(S): at 08:48

## 2020-12-05 RX ADMIN — SODIUM CHLORIDE 3 MILLILITER(S): 9 INJECTION INTRAMUSCULAR; INTRAVENOUS; SUBCUTANEOUS at 21:35

## 2020-12-05 RX ADMIN — SODIUM CHLORIDE 3 MILLILITER(S): 9 INJECTION INTRAMUSCULAR; INTRAVENOUS; SUBCUTANEOUS at 14:00

## 2020-12-05 NOTE — PROGRESS NOTE ADULT - SUBJECTIVE AND OBJECTIVE BOX
CHIEF COMPLAINT: Patient is a 89y old  female who presents with a chief complaint of Generalized weakness x 5 days (04 Dec 2020 20:27)      SUBJECTIVE / OVERNIGHT EVENTS:    No events overnight.    MEDICATIONS  (STANDING):  azithromycin  IVPB 500 milliGRAM(s) IV Intermittent every 24 hours  cefTRIAXone   IVPB 1000 milliGRAM(s) IV Intermittent every 24 hours  dextrose 40% Gel 15 Gram(s) Oral once  dextrose 5%. 1000 milliLiter(s) (50 mL/Hr) IV Continuous <Continuous>  dextrose 5%. 1000 milliLiter(s) (100 mL/Hr) IV Continuous <Continuous>  dextrose 50% Injectable 25 Gram(s) IV Push once  dextrose 50% Injectable 12.5 Gram(s) IV Push once  dextrose 50% Injectable 25 Gram(s) IV Push once  enoxaparin Injectable 30 milliGRAM(s) SubCutaneous daily  glucagon  Injectable 1 milliGRAM(s) IntraMuscular once  insulin lispro (ADMELOG) corrective regimen sliding scale   SubCutaneous three times a day before meals  insulin lispro (ADMELOG) corrective regimen sliding scale   SubCutaneous at bedtime  lactobacillus acidophilus 1 Tablet(s) Oral two times a day  lisinopril 10 milliGRAM(s) Oral daily  metoprolol tartrate 50 milliGRAM(s) Oral two times a day  simvastatin 40 milliGRAM(s) Oral at bedtime  sodium chloride 0.9% lock flush 3 milliLiter(s) IV Push every 8 hours    MEDICATIONS  (PRN):  acetaminophen   Tablet .. 650 milliGRAM(s) Oral every 6 hours PRN Temp greater or equal to 38C (100.4F), Mild Pain (1 - 3), Moderate Pain (4 - 6)      VITALS:  T(F): 97.8 (20 @ 12:35), Max: 98 (20 @ 13:41)  HR: 93 (20 @ 12:35) (81 - 96)  BP: 110/62 (20 @ 12:35) (97/51 - 123/55)  RR: 16 (20 @ 12:35) (16 - 18)  SpO2: 97% (20 @ 12:35)  Height (cm): 152.4 (23:50)  Weight (kg): 42.7 (23:50)  BMI (kg/m2): 18.4 (23:50)    CAPILLARY BLOOD GLUCOSE    Output   Height (cm): 152.4 (23:50)  Weight (kg): 42.7 (23:50)  BMI (kg/m2): 18.4 (23:50)  I&O's Summary  T(F): 97.8 (20 @ 12:35), Max: 98 (20 @ 13:41)  HR: 93 (20 @ 12:35) (81 - 96)  BP: 110/62 (20 @ 12:35) (97/51 - 123/55)  RR: 16 (20 @ 12:35) (16 - 18)  SpO2: 97% (20 @ 12:35)    PHYSICAL EXAM:  GENERAL: NAD, well-developed  HEAD:  Atraumatic, Normocephalic  EYES: EOMI  NECK: Supple, No JVD  CHEST/LUNG: nonlabored breathing  HEART: nl S1/S2  ABDOMEN: nondistended, soft  EXTREMITIES:  no LE edema  PSYCH: alert, answering questions appropriately  NEUROLOGY: non-focal  SKIN: No rashes noted    LABS:              11.9                 136  | 14   | 61           13.98 >-----------< 297     ------------------------< 124                   36.7                 4.4  | 103  | 1.09                                         Ca 9.5   Mg 1.3   Ph 3.3         TPro  6.3  /  Alb  2.8      TBili  0.3  /  DBili  x         AST  20  /  ALT  11            AlkPhos  79        CARDIAC MARKERS  x     / 63 u/L / 6.17 ng/mL  CARDIAC MARKERS  x     / 69 u/L / 6.12 ng/mL      Urinalysis Basic - ( 04 Dec 2020 18:40 )    Color: YELLOW / Appearance: CLEAR / S.025 / pH: 5.5  Gluc: NEGATIVE / Ketone: SMALL  / Bili: NEGATIVE / Urobili: TRACE   Blood: TRACE / Protein: 30 / Nitrite: NEGATIVE   Leuk Esterase: SMALL / RBC: 3-5 / WBC 6-10   Sq Epi: OCC / Non Sq Epi: x / Bacteria: NEGATIVE        VB-05 @ 03:34  7.38/27/99/18/96.7/-8.2        MICROBIOLOGY:        RADIOLOGY & ADDITIONAL TESTS:    Imaging Personally Reviewed:    < from: Xray Chest 1 View AP/PA (20 @ 17:42) >      < end of copied text >        [x] Care Discussed with Consultants/Other Providers:      Carl Husain M.D.  Hospitalist  Pager [phone]

## 2020-12-05 NOTE — PHYSICAL THERAPY INITIAL EVALUATION ADULT - PERTINENT HX OF CURRENT PROBLEM, REHAB EVAL
Patient is 89 year old female admitted with history of CAD s/p 3VCABG 2005 HTN, Hyperlipidemia, DM2, presents with generalized weakness with intermittent Frontal head ache.

## 2020-12-05 NOTE — PROGRESS NOTE ADULT - PROBLEM SELECTOR PLAN 2
Incentive spirometry.  Continue Ceftriaxone and Zithromax IV.  lactobacillus for bruce karen protection.   Check urine legionella   Tylenol PRN.

## 2020-12-05 NOTE — PROGRESS NOTE ADULT - PROBLEM SELECTOR PLAN 3
Generalized weakness, exertional lumbar back pain with 1 episode of urinary incontinence. Less likely due to cord compression since the pt only had 1 episode of urinary incontinence. No episodes of bowel incontinence or b/l LE weakness. Less likely stroke due to CT head with no acute findings and no neurologic deficits. Most likely due to community acquired PNA with leukocytosis and preliminary CXR finding.

## 2020-12-05 NOTE — PROGRESS NOTE ADULT - PROBLEM SELECTOR PLAN 4
Concern for salicylate toxicity. Check salicylate level  Will hold aspirin for now  Pt given IVF Bolus with NS x 1 L.  F/U VBG and lactate.

## 2020-12-05 NOTE — PROGRESS NOTE ADULT - PROBLEM SELECTOR PLAN 1
Trop 85 decreasing to 77. Chest pain free.  Cardiac monitor.  F/U #3 CE.  Give O2, ASA, BB, ACE, Statin.  Cardiology consult in AM

## 2020-12-06 LAB
ANION GAP SERPL CALC-SCNC: 17 MMOL/L — HIGH (ref 7–14)
BUN SERPL-MCNC: 62 MG/DL — HIGH (ref 7–23)
CALCIUM SERPL-MCNC: 9.4 MG/DL — SIGNIFICANT CHANGE UP (ref 8.4–10.5)
CHLORIDE SERPL-SCNC: 97 MMOL/L — LOW (ref 98–107)
CO2 SERPL-SCNC: 16 MMOL/L — LOW (ref 22–31)
CREAT SERPL-MCNC: 1.15 MG/DL — SIGNIFICANT CHANGE UP (ref 0.5–1.3)
GLUCOSE SERPL-MCNC: 113 MG/DL — HIGH (ref 70–99)
HCT VFR BLD CALC: 34.7 % — SIGNIFICANT CHANGE UP (ref 34.5–45)
HGB BLD-MCNC: 11.4 G/DL — LOW (ref 11.5–15.5)
LEGIONELLA AG UR QL: NEGATIVE — SIGNIFICANT CHANGE UP
MAGNESIUM SERPL-MCNC: 1.8 MG/DL — SIGNIFICANT CHANGE UP (ref 1.6–2.6)
MCHC RBC-ENTMCNC: 28.4 PG — SIGNIFICANT CHANGE UP (ref 27–34)
MCHC RBC-ENTMCNC: 32.9 GM/DL — SIGNIFICANT CHANGE UP (ref 32–36)
MCV RBC AUTO: 86.5 FL — SIGNIFICANT CHANGE UP (ref 80–100)
NRBC # BLD: 0 /100 WBCS — SIGNIFICANT CHANGE UP
NRBC # FLD: 0 K/UL — SIGNIFICANT CHANGE UP
PHOSPHATE SERPL-MCNC: 2.8 MG/DL — SIGNIFICANT CHANGE UP (ref 2.5–4.5)
PLATELET # BLD AUTO: 254 K/UL — SIGNIFICANT CHANGE UP (ref 150–400)
POTASSIUM SERPL-MCNC: 3.7 MMOL/L — SIGNIFICANT CHANGE UP (ref 3.5–5.3)
POTASSIUM SERPL-SCNC: 3.7 MMOL/L — SIGNIFICANT CHANGE UP (ref 3.5–5.3)
RBC # BLD: 4.01 M/UL — SIGNIFICANT CHANGE UP (ref 3.8–5.2)
RBC # FLD: 13.9 % — SIGNIFICANT CHANGE UP (ref 10.3–14.5)
SODIUM SERPL-SCNC: 130 MMOL/L — LOW (ref 135–145)
WBC # BLD: 10.36 K/UL — SIGNIFICANT CHANGE UP (ref 3.8–10.5)
WBC # FLD AUTO: 10.36 K/UL — SIGNIFICANT CHANGE UP (ref 3.8–10.5)

## 2020-12-06 PROCEDURE — 99233 SBSQ HOSP IP/OBS HIGH 50: CPT

## 2020-12-06 RX ORDER — LEVOFLOXACIN 5 MG/ML
750 INJECTION, SOLUTION INTRAVENOUS ONCE
Refills: 0 | Status: COMPLETED | OUTPATIENT
Start: 2020-12-07 | End: 2020-12-07

## 2020-12-06 RX ORDER — ASPIRIN/CALCIUM CARB/MAGNESIUM 324 MG
81 TABLET ORAL DAILY
Refills: 0 | Status: DISCONTINUED | OUTPATIENT
Start: 2020-12-06 | End: 2020-12-11

## 2020-12-06 RX ADMIN — ENOXAPARIN SODIUM 30 MILLIGRAM(S): 100 INJECTION SUBCUTANEOUS at 12:42

## 2020-12-06 RX ADMIN — CEFTRIAXONE 100 MILLIGRAM(S): 500 INJECTION, POWDER, FOR SOLUTION INTRAMUSCULAR; INTRAVENOUS at 12:42

## 2020-12-06 RX ADMIN — SODIUM CHLORIDE 3 MILLILITER(S): 9 INJECTION INTRAMUSCULAR; INTRAVENOUS; SUBCUTANEOUS at 06:14

## 2020-12-06 RX ADMIN — Medication 0: at 08:12

## 2020-12-06 RX ADMIN — Medication 2: at 12:42

## 2020-12-06 RX ADMIN — SIMVASTATIN 40 MILLIGRAM(S): 20 TABLET, FILM COATED ORAL at 21:32

## 2020-12-06 RX ADMIN — Medication 1 TABLET(S): at 06:14

## 2020-12-06 RX ADMIN — SODIUM CHLORIDE 3 MILLILITER(S): 9 INJECTION INTRAMUSCULAR; INTRAVENOUS; SUBCUTANEOUS at 12:43

## 2020-12-06 RX ADMIN — Medication 1 TABLET(S): at 17:21

## 2020-12-06 RX ADMIN — AZITHROMYCIN 255 MILLIGRAM(S): 500 TABLET, FILM COATED ORAL at 12:42

## 2020-12-06 NOTE — PROGRESS NOTE ADULT - PROBLEM SELECTOR PLAN 4
Patient with respiratory alkalosis + AG acidosis (VBG pH normal)  - etiology of AG acidosis unclear - possibly 2/2 renal failure  - Cr 1.15 however patient with low muscle mass, and BUN is 62  - lactate normal, low suspicion for salicylate toxicity  - monitor for now

## 2020-12-06 NOTE — PROGRESS NOTE ADULT - ASSESSMENT
89F history of CAD s/p 3V CABG, Dm2, HTN, Hyperlipidemia, admitted for elevated Troponins, leukocytosis, likely due to preliminary CXR revealing b/l lung hazy opacities likely of infectious etiology, improved with abx.

## 2020-12-06 NOTE — PROGRESS NOTE ADULT - SUBJECTIVE AND OBJECTIVE BOX
CHIEF COMPLAINT: Patient is a 89y old  female who presents with a chief complaint of Generalized weakness x 5 days (05 Dec 2020 13:34)      SUBJECTIVE / OVERNIGHT EVENTS:    Feeling better. Denies SOB. Would like to get up and walk. Denies other complaints.    MEDICATIONS  (STANDING):  azithromycin  IVPB 500 milliGRAM(s) IV Intermittent every 24 hours  cefTRIAXone   IVPB 1000 milliGRAM(s) IV Intermittent every 24 hours  dextrose 40% Gel 15 Gram(s) Oral once  dextrose 5%. 1000 milliLiter(s) (50 mL/Hr) IV Continuous <Continuous>  dextrose 5%. 1000 milliLiter(s) (100 mL/Hr) IV Continuous <Continuous>  dextrose 50% Injectable 25 Gram(s) IV Push once  dextrose 50% Injectable 12.5 Gram(s) IV Push once  dextrose 50% Injectable 25 Gram(s) IV Push once  enoxaparin Injectable 30 milliGRAM(s) SubCutaneous daily  glucagon  Injectable 1 milliGRAM(s) IntraMuscular once  insulin lispro (ADMELOG) corrective regimen sliding scale   SubCutaneous three times a day before meals  insulin lispro (ADMELOG) corrective regimen sliding scale   SubCutaneous at bedtime  lactobacillus acidophilus 1 Tablet(s) Oral two times a day  lisinopril 10 milliGRAM(s) Oral daily  metoprolol tartrate 50 milliGRAM(s) Oral two times a day  simvastatin 40 milliGRAM(s) Oral at bedtime  sodium chloride 0.9% lock flush 3 milliLiter(s) IV Push every 8 hours    MEDICATIONS  (PRN):  acetaminophen   Tablet .. 650 milliGRAM(s) Oral every 6 hours PRN Temp greater or equal to 38C (100.4F), Mild Pain (1 - 3), Moderate Pain (4 - 6)      VITALS:  T(F): 98.3 (20 @ 06:16), Max: 98.6 (20 @ 15:40)  HR: 100 (20 @ 08:49) (90 - 102)  BP: 95/48 (20 @ 08:49) (94/42 - 102/60)  RR: 18 (20 @ 08:49) (16 - 18)  SpO2: 97% (20 @ 08:49)      CAPILLARY BLOOD GLUCOSE    Output     I&O's Summary  T(F): 98.3 (20 @ 06:16), Max: 98.6 (20 @ 15:40)  HR: 100 (20 @ 08:49) (90 - 102)  BP: 95/48 (20 @ 08:49) (94/42 - 102/60)  RR: 18 (20 @ 08:49) (16 - 18)  SpO2: 97% (20 @ 08:49)    PHYSICAL EXAM:  GENERAL: NAD, well-developed  HEAD:  Atraumatic, Normocephalic  EYES: EOMI  NECK: Supple, No JVD  CHEST/LUNG: nonlabored breathing, CTAB  HEART: nl S1/S2  ABDOMEN: nondistended, soft  EXTREMITIES:  no LE edema  PSYCH: alert, answering questions appropriately  NEUROLOGY: non-focal  SKIN: No rashes noted    LABS:              11.4                 130  | 16   | 62           10.36 >-----------< 254     ------------------------< 113                   34.7                 3.7  | 97   | 1.15                                         Ca 9.4   Mg 1.8   Ph 2.8         TPro  6.3  /  Alb  2.8      TBili  0.3  /  DBili  x         AST  20  /  ALT  11            AlkPhos  79        CARDIAC MARKERS  x     / 63 u/L / 6.17 ng/mL  CARDIAC MARKERS  x     / 69 u/L / 6.12 ng/mL      Urinalysis Basic - ( 04 Dec 2020 18:40 )    Color: YELLOW / Appearance: CLEAR / S.025 / pH: 5.5  Gluc: NEGATIVE / Ketone: SMALL  / Bili: NEGATIVE / Urobili: TRACE   Blood: TRACE / Protein: 30 / Nitrite: NEGATIVE   Leuk Esterase: SMALL / RBC: 3-5 / WBC 6-10   Sq Epi: OCC / Non Sq Epi: x / Bacteria: NEGATIVE        VB-05 @ 03:34  7.38/27/99/18/96.7/-8.2        MICROBIOLOGY:    Culture - Blood (collected 05 Dec 2020 02:20)  Source: .Blood Blood-Peripheral  Preliminary Report (06 Dec 2020 03:01):    No growth to date.    Culture - Blood (collected 05 Dec 2020 02:20)  Source: .Blood Blood-Peripheral  Preliminary Report (06 Dec 2020 03:01):    No growth to date.    Culture - Urine (collected 04 Dec 2020 19:28)  Source: .Urine Clean Catch (Midstream)  Final Report (05 Dec 2020 16:12):    <10,000 CFU/mL Normal Urogenital Amira          RADIOLOGY & ADDITIONAL TESTS:    Imaging Personally Reviewed:    < from: Xray Chest 1 View AP/PA (20 @ 17:42) >      < end of copied text >        [x] Care Discussed with Consultants/Other Providers:      Carl Husain M.D.  Hospitalist  Pager [phone]

## 2020-12-06 NOTE — PROGRESS NOTE ADULT - PROBLEM SELECTOR PLAN 2
Clinically much improved, SOB improved, breathing comfortably on room air, minimal cough, leukocytosis resolved  - change abx to oral levaquin  - complete 5 day course of abx for CAP  - CXR could be compatible with COVID-19 however swab neg x 2  - plan for DC home with outpatient pulmonary f/u

## 2020-12-06 NOTE — PROGRESS NOTE ADULT - PROBLEM SELECTOR PLAN 1
Trop 85 decreasing to 77. Chest pain free.  Likely 2/2 trop leak, demand ischemia  Continue aspirin, statin

## 2020-12-07 LAB
ALBUMIN SERPL ELPH-MCNC: 2.2 G/DL — LOW (ref 3.3–5)
ALP SERPL-CCNC: 103 U/L — SIGNIFICANT CHANGE UP (ref 40–120)
ALT FLD-CCNC: 12 U/L — SIGNIFICANT CHANGE UP (ref 4–33)
ANION GAP SERPL CALC-SCNC: 14 MMOL/L — SIGNIFICANT CHANGE UP (ref 7–14)
AST SERPL-CCNC: 19 U/L — SIGNIFICANT CHANGE UP (ref 4–32)
BILIRUB SERPL-MCNC: <0.2 MG/DL — SIGNIFICANT CHANGE UP (ref 0.2–1.2)
BUN SERPL-MCNC: 62 MG/DL — HIGH (ref 7–23)
CALCIUM SERPL-MCNC: 9.4 MG/DL — SIGNIFICANT CHANGE UP (ref 8.4–10.5)
CHLORIDE SERPL-SCNC: 100 MMOL/L — SIGNIFICANT CHANGE UP (ref 98–107)
CO2 SERPL-SCNC: 19 MMOL/L — LOW (ref 22–31)
CREAT SERPL-MCNC: 1.07 MG/DL — SIGNIFICANT CHANGE UP (ref 0.5–1.3)
GLUCOSE BLDC GLUCOMTR-MCNC: 112 MG/DL — HIGH (ref 70–99)
GLUCOSE BLDC GLUCOMTR-MCNC: 120 MG/DL — HIGH (ref 70–99)
GLUCOSE BLDC GLUCOMTR-MCNC: 145 MG/DL — HIGH (ref 70–99)
GLUCOSE SERPL-MCNC: 109 MG/DL — HIGH (ref 70–99)
HCT VFR BLD CALC: 36 % — SIGNIFICANT CHANGE UP (ref 34.5–45)
HGB BLD-MCNC: 11.7 G/DL — SIGNIFICANT CHANGE UP (ref 11.5–15.5)
MAGNESIUM SERPL-MCNC: 1.6 MG/DL — SIGNIFICANT CHANGE UP (ref 1.6–2.6)
MCHC RBC-ENTMCNC: 27.9 PG — SIGNIFICANT CHANGE UP (ref 27–34)
MCHC RBC-ENTMCNC: 32.5 GM/DL — SIGNIFICANT CHANGE UP (ref 32–36)
MCV RBC AUTO: 85.9 FL — SIGNIFICANT CHANGE UP (ref 80–100)
NRBC # BLD: 0 /100 WBCS — SIGNIFICANT CHANGE UP
NRBC # FLD: 0 K/UL — SIGNIFICANT CHANGE UP
PLATELET # BLD AUTO: 209 K/UL — SIGNIFICANT CHANGE UP (ref 150–400)
POTASSIUM SERPL-MCNC: 4.1 MMOL/L — SIGNIFICANT CHANGE UP (ref 3.5–5.3)
POTASSIUM SERPL-SCNC: 4.1 MMOL/L — SIGNIFICANT CHANGE UP (ref 3.5–5.3)
PROT SERPL-MCNC: 5.3 G/DL — LOW (ref 6–8.3)
RBC # BLD: 4.19 M/UL — SIGNIFICANT CHANGE UP (ref 3.8–5.2)
RBC # FLD: 14 % — SIGNIFICANT CHANGE UP (ref 10.3–14.5)
SODIUM SERPL-SCNC: 133 MMOL/L — LOW (ref 135–145)
WBC # BLD: 11.26 K/UL — HIGH (ref 3.8–10.5)
WBC # FLD AUTO: 11.26 K/UL — HIGH (ref 3.8–10.5)

## 2020-12-07 PROCEDURE — 99233 SBSQ HOSP IP/OBS HIGH 50: CPT

## 2020-12-07 RX ORDER — SODIUM CHLORIDE 9 MG/ML
1000 INJECTION INTRAMUSCULAR; INTRAVENOUS; SUBCUTANEOUS
Refills: 0 | Status: DISCONTINUED | OUTPATIENT
Start: 2020-12-07 | End: 2020-12-08

## 2020-12-07 RX ORDER — LEVOFLOXACIN 5 MG/ML
750 INJECTION, SOLUTION INTRAVENOUS
Refills: 0 | Status: DISCONTINUED | OUTPATIENT
Start: 2020-12-09 | End: 2020-12-10

## 2020-12-07 RX ORDER — MAGNESIUM SULFATE 500 MG/ML
2 VIAL (ML) INJECTION ONCE
Refills: 0 | Status: COMPLETED | OUTPATIENT
Start: 2020-12-07 | End: 2020-12-07

## 2020-12-07 RX ADMIN — Medication 81 MILLIGRAM(S): at 14:07

## 2020-12-07 RX ADMIN — SIMVASTATIN 40 MILLIGRAM(S): 20 TABLET, FILM COATED ORAL at 21:08

## 2020-12-07 RX ADMIN — ENOXAPARIN SODIUM 30 MILLIGRAM(S): 100 INJECTION SUBCUTANEOUS at 14:07

## 2020-12-07 RX ADMIN — SODIUM CHLORIDE 75 MILLILITER(S): 9 INJECTION INTRAMUSCULAR; INTRAVENOUS; SUBCUTANEOUS at 21:08

## 2020-12-07 RX ADMIN — Medication 1 TABLET(S): at 18:09

## 2020-12-07 RX ADMIN — SODIUM CHLORIDE 75 MILLILITER(S): 9 INJECTION INTRAMUSCULAR; INTRAVENOUS; SUBCUTANEOUS at 14:07

## 2020-12-07 RX ADMIN — Medication 1 TABLET(S): at 04:29

## 2020-12-07 RX ADMIN — LEVOFLOXACIN 750 MILLIGRAM(S): 5 INJECTION, SOLUTION INTRAVENOUS at 04:29

## 2020-12-07 RX ADMIN — Medication 50 GRAM(S): at 14:38

## 2020-12-07 NOTE — PROGRESS NOTE ADULT - PROBLEM SELECTOR PLAN 5
Continues to be elevated  Suspect underlying CKD  Encourage oral hydration - patient reports thirst A1c 6.5 - well controlled  Monitor fingersticks

## 2020-12-07 NOTE — PROGRESS NOTE ADULT - SUBJECTIVE AND OBJECTIVE BOX
Patient is a 89y old  Female who presents with a chief complaint of Generalized weakness x 5 days (06 Dec 2020 13:30)      SUBJECTIVE / OVERNIGHT EVENTS:      MEDICATIONS  (STANDING):  aspirin  chewable 81 milliGRAM(s) Oral daily  dextrose 40% Gel 15 Gram(s) Oral once  dextrose 5%. 1000 milliLiter(s) (50 mL/Hr) IV Continuous <Continuous>  dextrose 5%. 1000 milliLiter(s) (100 mL/Hr) IV Continuous <Continuous>  dextrose 50% Injectable 25 Gram(s) IV Push once  dextrose 50% Injectable 12.5 Gram(s) IV Push once  dextrose 50% Injectable 25 Gram(s) IV Push once  enoxaparin Injectable 30 milliGRAM(s) SubCutaneous daily  glucagon  Injectable 1 milliGRAM(s) IntraMuscular once  insulin lispro (ADMELOG) corrective regimen sliding scale   SubCutaneous three times a day before meals  insulin lispro (ADMELOG) corrective regimen sliding scale   SubCutaneous at bedtime  lactobacillus acidophilus 1 Tablet(s) Oral two times a day  magnesium sulfate  IVPB 2 Gram(s) IV Intermittent once  metoprolol tartrate 50 milliGRAM(s) Oral two times a day  simvastatin 40 milliGRAM(s) Oral at bedtime    MEDICATIONS  (PRN):  acetaminophen   Tablet .. 650 milliGRAM(s) Oral every 6 hours PRN Temp greater or equal to 38C (100.4F), Mild Pain (1 - 3), Moderate Pain (4 - 6)      Vital Signs Last 24 Hrs  T(C): 36.2 (07 Dec 2020 10:57), Max: 36.9 (07 Dec 2020 02:23)  T(F): 97.1 (07 Dec 2020 10:57), Max: 98.4 (07 Dec 2020 02:23)  HR: 96 (07 Dec 2020 10:57) (95 - 122)  BP: 101/55 (07 Dec 2020 10:57) (92/48 - 101/55)  BP(mean): --  RR: 16 (07 Dec 2020 10:57) (16 - 20)  SpO2: 95% (07 Dec 2020 10:57) (95% - 96%)      CAPILLARY BLOOD GLUCOSE  POCT Blood Glucose.: 145 mg/dL (07 Dec 2020 11:59)  POCT Blood Glucose.: 133 mg/dL (07 Dec 2020 08:25)  POCT Blood Glucose.: 152 mg/dL (06 Dec 2020 21:20)  POCT Blood Glucose.: 144 mg/dL (06 Dec 2020 16:45)        I&O's Summary    06 Dec 2020 07:01  -  07 Dec 2020 07:00  --------------------------------------------------------  IN: 200 mL / OUT: 300 mL / NET: -100 mL          PHYSICAL EXAM  GENERAL: NAD, well-developed  HEAD:  Atraumatic, Normocephalic  EYES: EOMI, PERRLA, conjunctiva and sclera clear  NECK: Supple, No JVD  CHEST/LUNG: Clear to auscultation bilaterally; No wheeze  HEART: Regular rate and rhythm; No murmurs, rubs, or gallops  ABDOMEN: Soft, Nontender, Nondistended; Bowel sounds present  EXTREMITIES:  2+ Peripheral Pulses, No clubbing, cyanosis, or edema  PSYCH: AAOx3  SKIN: No rashes or lesions    LABS:                        11.7   11.26 )-----------( 209      ( 07 Dec 2020 11:00 )             36.0     12-07    133<L>  |  100  |  62<H>  ----------------------------<  109<H>  4.1   |  19<L>  |  1.07    Ca    9.4      07 Dec 2020 11:00  Phos  2.8     12-06  Mg     1.6     12-07    TPro  5.3<L>  /  Alb  2.2<L>  /  TBili  <0.2  /  DBili  x   /  AST  19  /  ALT  12  /  AlkPhos  103  12-07              RADIOLOGY & ADDITIONAL TESTS:    Imaging Personally Reviewed:  Consultant(s) Notes Reviewed:    Care Discussed with Consultants/Other Providers:   Patient is a 89y old  Female who presents with a chief complaint of Generalized weakness x 5 days (06 Dec 2020 13:30)    SUBJECTIVE / OVERNIGHT EVENTS:  Patient feels better than prior days. She has poor appetite and decreased oral intake. She also has generalized weakness, but wants to get out of bed. No fever, chills, chest pain, SOB, n/v or abdominal pain. No arrhythmias noted on telemetry.    MEDICATIONS  (STANDING):  aspirin  chewable 81 milliGRAM(s) Oral daily  dextrose 40% Gel 15 Gram(s) Oral once  dextrose 5%. 1000 milliLiter(s) (50 mL/Hr) IV Continuous <Continuous>  dextrose 5%. 1000 milliLiter(s) (100 mL/Hr) IV Continuous <Continuous>  dextrose 50% Injectable 25 Gram(s) IV Push once  dextrose 50% Injectable 12.5 Gram(s) IV Push once  dextrose 50% Injectable 25 Gram(s) IV Push once  enoxaparin Injectable 30 milliGRAM(s) SubCutaneous daily  glucagon  Injectable 1 milliGRAM(s) IntraMuscular once  insulin lispro (ADMELOG) corrective regimen sliding scale   SubCutaneous three times a day before meals  insulin lispro (ADMELOG) corrective regimen sliding scale   SubCutaneous at bedtime  lactobacillus acidophilus 1 Tablet(s) Oral two times a day  magnesium sulfate  IVPB 2 Gram(s) IV Intermittent once  metoprolol tartrate 50 milliGRAM(s) Oral two times a day  simvastatin 40 milliGRAM(s) Oral at bedtime    MEDICATIONS  (PRN):  acetaminophen   Tablet .. 650 milliGRAM(s) Oral every 6 hours PRN Temp greater or equal to 38C (100.4F), Mild Pain (1 - 3), Moderate Pain (4 - 6)      Vital Signs Last 24 Hrs  T(C): 36.2 (07 Dec 2020 10:57), Max: 36.9 (07 Dec 2020 02:23)  T(F): 97.1 (07 Dec 2020 10:57), Max: 98.4 (07 Dec 2020 02:23)  HR: 96 (07 Dec 2020 10:57) (95 - 122)  BP: 101/55 (07 Dec 2020 10:57) (92/48 - 101/55)  RR: 16 (07 Dec 2020 10:57) (16 - 20)  SpO2: 95% (07 Dec 2020 10:57) (95% - 96%)      CAPILLARY BLOOD GLUCOSE  POCT Blood Glucose.: 145 mg/dL (07 Dec 2020 11:59)  POCT Blood Glucose.: 133 mg/dL (07 Dec 2020 08:25)  POCT Blood Glucose.: 152 mg/dL (06 Dec 2020 21:20)  POCT Blood Glucose.: 144 mg/dL (06 Dec 2020 16:45)        I&O's Summary    06 Dec 2020 07:01  -  07 Dec 2020 07:00  --------------------------------------------------------  IN: 200 mL / OUT: 300 mL / NET: -100 mL          PHYSICAL EXAM  GENERAL: NAD, small framed, non-toxic appearing, appeared younger than stated age but fatigued  CHEST/LUNG: Clear to auscultation bilaterally; No wheeze  HEART: Regular rate and rhythm; No murmurs, rubs, or gallops  ABDOMEN: Soft, Nontender, Nondistended; Bowel sounds present  EXTREMITIES:  2+ Peripheral Pulses, No clubbing, cyanosis, or edema  PSYCH: AAOx3  GAIT: gait assessment defer. Pt able to stand with assistance of a walker but easily fatigued and SOB  SKIN: scattered ecchymoses on extremities.         LABS:                        11.7   11.26 )-----------( 209      ( 07 Dec 2020 11:00 )             36.0     12-07    133<L>  |  100  |  62<H>  ----------------------------<  109<H>  4.1   |  19<L>  |  1.07    Ca    9.4      07 Dec 2020 11:00  Phos  2.8     12-06  Mg     1.6     12-07    TPro  5.3<L>  /  Alb  2.2<L>  /  TBili  <0.2  /  DBili  x   /  AST  19  /  ALT  12  /  AlkPhos  103  12-07

## 2020-12-07 NOTE — PROGRESS NOTE ADULT - PROBLEM SELECTOR PLAN 8
F/U Lipid panel.  Continue Statin. Pending repeat PT eval. However, if patient continues to improve, anticipate d/c home in the next 24-48h with likely home PT referral.    1.  Name of PCP: Andre Kearns   2.  PCP Contacted on Admission: [ ] Y    [X] N    3.  PCP contacted at Discharge: [ ] Y    [ ] N    [ ] N/A  4.  Post-Discharge Appointment Date and Location:  5.  Summary of Handoff given to PCP:

## 2020-12-07 NOTE — PROGRESS NOTE ADULT - PROBLEM SELECTOR PLAN 2
Clinically improved. Pt empirically started on ceftriaxone/azithro --> transitioned now to levofloxacin. COVID-19 however swab neg x 2. Pt to complete 5 day course of abx for community acquired PNA  - continue levofloxacin, currently on day 4 Clinically improved. Pt empirically started on ceftriaxone/azithro --> transitioned now to levofloxacin. COVID-19 however swab neg x 2. Pt to complete 5 day course of abx for community acquired PNA  - given levofloxacin 750mg today, currently on day 4 of abx. Given pt with CrCl 23, pt to receive next and final dose on 12/9/20

## 2020-12-07 NOTE — PHARMACOTHERAPY INTERVENTION NOTE - COMMENTS
Patient and daughter counseled on relevant medication indications, dosing, administration, side effects, and monitoring.  Also reviewed importance of follow-up with outpatient care providers, medication adherence, and self-care.  Patient and daughter demonstrated understanding.    Emanuel Snyder, PharmD  Clinical Pharmacist- Internal Medicine  [facility] [id number]

## 2020-12-07 NOTE — PROGRESS NOTE ADULT - PROBLEM SELECTOR PLAN 1
Trop 85 decreasing to 77. Chest pain free.  Likely 2/2 trop leak, demand ischemia  Continue aspirin, statin Likely 2/2 trop leak, demand ischemia. No events on telemetry. Noted elevated proBNP 7007. No signs or symptoms of acute HF  - Continue aspirin 81mg daily  - simvastatin 40mg daily  - can d/c continuous cardiac monitoring.

## 2020-12-07 NOTE — PROGRESS NOTE ADULT - PROBLEM SELECTOR PLAN 3
Improving. Initially deemed candidate for Pt  PT consulted, recommending YAEL on 12/4 however given improvement, f/u repeat assessment Improving. Initially deemed candidate for rehab. However, pt and her daughter would prefer to go home. Pt able to stand but still very weak.  - f/u repeat PT assessment  - OOB to chair

## 2020-12-07 NOTE — PROGRESS NOTE ADULT - ASSESSMENT
89F history of CAD s/p 3V CABG, Dm2, HTN, Hyperlipidemia, admitted for elevated Troponins, leukocytosis, likely due to preliminary CXR revealing b/l lung hazy opacities likely of infectious etiology, improved with abx.   89F history of CAD s/p 3V CABG, Dm2, HTN, Hyperlipidemia, admitted for elevated Troponin level 2/2 demand ischemia for community acquired PNA. COVID-19 infection r/o x2.

## 2020-12-07 NOTE — PROGRESS NOTE ADULT - PROBLEM SELECTOR PLAN 6
A1c 6.5 - well controlled  Monitor fingersticks Low normal BP noted  Stop lisinopril for now  Liberalize salt intake

## 2020-12-07 NOTE — PROGRESS NOTE ADULT - PROBLEM SELECTOR PLAN 4
Patient with respiratory alkalosis + AG acidosis (VBG pH normal)  - etiology of AG acidosis unclear - possibly 2/2 renal failure  - Cr 1.15 however patient with low muscle mass, and BUN is 62  - lactate normal, low suspicion for salicylate toxicity  - monitor for now Resolved. Patient with respiratory alkalosis + AG acidosis (VBG pH normal). Etiology of AG acidosis unclear - presumed possibly 2/2 renal failure but likely due to acute illness and decreased oral intake

## 2020-12-08 DIAGNOSIS — E86.1 HYPOVOLEMIA: ICD-10-CM

## 2020-12-08 DIAGNOSIS — I25.10 ATHEROSCLEROTIC HEART DISEASE OF NATIVE CORONARY ARTERY WITHOUT ANGINA PECTORIS: ICD-10-CM

## 2020-12-08 LAB
ANION GAP SERPL CALC-SCNC: 16 MMOL/L — HIGH (ref 7–14)
ANION GAP SERPL CALC-SCNC: 16 MMOL/L — HIGH (ref 7–14)
BUN SERPL-MCNC: 60 MG/DL — HIGH (ref 7–23)
BUN SERPL-MCNC: 64 MG/DL — HIGH (ref 7–23)
CALCIUM SERPL-MCNC: 9.2 MG/DL — SIGNIFICANT CHANGE UP (ref 8.4–10.5)
CALCIUM SERPL-MCNC: 9.6 MG/DL — SIGNIFICANT CHANGE UP (ref 8.4–10.5)
CHLORIDE SERPL-SCNC: 101 MMOL/L — SIGNIFICANT CHANGE UP (ref 98–107)
CHLORIDE SERPL-SCNC: 98 MMOL/L — SIGNIFICANT CHANGE UP (ref 98–107)
CO2 SERPL-SCNC: 15 MMOL/L — LOW (ref 22–31)
CO2 SERPL-SCNC: 17 MMOL/L — LOW (ref 22–31)
CREAT SERPL-MCNC: 1.12 MG/DL — SIGNIFICANT CHANGE UP (ref 0.5–1.3)
CREAT SERPL-MCNC: 1.27 MG/DL — SIGNIFICANT CHANGE UP (ref 0.5–1.3)
GLUCOSE BLDC GLUCOMTR-MCNC: 125 MG/DL — HIGH (ref 70–99)
GLUCOSE BLDC GLUCOMTR-MCNC: 126 MG/DL — HIGH (ref 70–99)
GLUCOSE BLDC GLUCOMTR-MCNC: 130 MG/DL — HIGH (ref 70–99)
GLUCOSE BLDC GLUCOMTR-MCNC: 85 MG/DL — SIGNIFICANT CHANGE UP (ref 70–99)
GLUCOSE SERPL-MCNC: 121 MG/DL — HIGH (ref 70–99)
GLUCOSE SERPL-MCNC: 95 MG/DL — SIGNIFICANT CHANGE UP (ref 70–99)
HCT VFR BLD CALC: 36.4 % — SIGNIFICANT CHANGE UP (ref 34.5–45)
HCT VFR BLD CALC: 38.9 % — SIGNIFICANT CHANGE UP (ref 34.5–45)
HGB BLD-MCNC: 12 G/DL — SIGNIFICANT CHANGE UP (ref 11.5–15.5)
HGB BLD-MCNC: 12.6 G/DL — SIGNIFICANT CHANGE UP (ref 11.5–15.5)
MAGNESIUM SERPL-MCNC: 2 MG/DL — SIGNIFICANT CHANGE UP (ref 1.6–2.6)
MAGNESIUM SERPL-MCNC: 2.2 MG/DL — SIGNIFICANT CHANGE UP (ref 1.6–2.6)
MCHC RBC-ENTMCNC: 28.3 PG — SIGNIFICANT CHANGE UP (ref 27–34)
MCHC RBC-ENTMCNC: 28.3 PG — SIGNIFICANT CHANGE UP (ref 27–34)
MCHC RBC-ENTMCNC: 32.4 GM/DL — SIGNIFICANT CHANGE UP (ref 32–36)
MCHC RBC-ENTMCNC: 33 GM/DL — SIGNIFICANT CHANGE UP (ref 32–36)
MCV RBC AUTO: 85.8 FL — SIGNIFICANT CHANGE UP (ref 80–100)
MCV RBC AUTO: 87.2 FL — SIGNIFICANT CHANGE UP (ref 80–100)
NRBC # BLD: 0 /100 WBCS — SIGNIFICANT CHANGE UP
NRBC # BLD: 0 /100 WBCS — SIGNIFICANT CHANGE UP
NRBC # FLD: 0 K/UL — SIGNIFICANT CHANGE UP
NRBC # FLD: 0 K/UL — SIGNIFICANT CHANGE UP
PHOSPHATE SERPL-MCNC: 2.6 MG/DL — SIGNIFICANT CHANGE UP (ref 2.5–4.5)
PHOSPHATE SERPL-MCNC: 2.7 MG/DL — SIGNIFICANT CHANGE UP (ref 2.5–4.5)
PLATELET # BLD AUTO: 228 K/UL — SIGNIFICANT CHANGE UP (ref 150–400)
PLATELET # BLD AUTO: 243 K/UL — SIGNIFICANT CHANGE UP (ref 150–400)
POTASSIUM SERPL-MCNC: 4 MMOL/L — SIGNIFICANT CHANGE UP (ref 3.5–5.3)
POTASSIUM SERPL-MCNC: 4.2 MMOL/L — SIGNIFICANT CHANGE UP (ref 3.5–5.3)
POTASSIUM SERPL-SCNC: 4 MMOL/L — SIGNIFICANT CHANGE UP (ref 3.5–5.3)
POTASSIUM SERPL-SCNC: 4.2 MMOL/L — SIGNIFICANT CHANGE UP (ref 3.5–5.3)
RBC # BLD: 4.24 M/UL — SIGNIFICANT CHANGE UP (ref 3.8–5.2)
RBC # BLD: 4.46 M/UL — SIGNIFICANT CHANGE UP (ref 3.8–5.2)
RBC # FLD: 14.1 % — SIGNIFICANT CHANGE UP (ref 10.3–14.5)
RBC # FLD: 14.2 % — SIGNIFICANT CHANGE UP (ref 10.3–14.5)
SODIUM SERPL-SCNC: 131 MMOL/L — LOW (ref 135–145)
SODIUM SERPL-SCNC: 132 MMOL/L — LOW (ref 135–145)
WBC # BLD: 13.39 K/UL — HIGH (ref 3.8–10.5)
WBC # BLD: 13.77 K/UL — HIGH (ref 3.8–10.5)
WBC # FLD AUTO: 13.39 K/UL — HIGH (ref 3.8–10.5)
WBC # FLD AUTO: 13.77 K/UL — HIGH (ref 3.8–10.5)

## 2020-12-08 PROCEDURE — 99233 SBSQ HOSP IP/OBS HIGH 50: CPT

## 2020-12-08 RX ORDER — SODIUM CHLORIDE 9 MG/ML
1000 INJECTION INTRAMUSCULAR; INTRAVENOUS; SUBCUTANEOUS
Refills: 0 | Status: DISCONTINUED | OUTPATIENT
Start: 2020-12-08 | End: 2020-12-08

## 2020-12-08 RX ORDER — SODIUM CHLORIDE 9 MG/ML
1000 INJECTION INTRAMUSCULAR; INTRAVENOUS; SUBCUTANEOUS ONCE
Refills: 0 | Status: COMPLETED | OUTPATIENT
Start: 2020-12-08 | End: 2020-12-08

## 2020-12-08 RX ORDER — SODIUM CHLORIDE 9 MG/ML
1000 INJECTION INTRAMUSCULAR; INTRAVENOUS; SUBCUTANEOUS
Refills: 0 | Status: DISCONTINUED | OUTPATIENT
Start: 2020-12-08 | End: 2020-12-09

## 2020-12-08 RX ADMIN — Medication 1 TABLET(S): at 17:05

## 2020-12-08 RX ADMIN — SIMVASTATIN 40 MILLIGRAM(S): 20 TABLET, FILM COATED ORAL at 22:06

## 2020-12-08 RX ADMIN — Medication 1 TABLET(S): at 05:14

## 2020-12-08 RX ADMIN — SODIUM CHLORIDE 500 MILLILITER(S): 9 INJECTION INTRAMUSCULAR; INTRAVENOUS; SUBCUTANEOUS at 10:38

## 2020-12-08 RX ADMIN — ENOXAPARIN SODIUM 30 MILLIGRAM(S): 100 INJECTION SUBCUTANEOUS at 12:26

## 2020-12-08 RX ADMIN — Medication 81 MILLIGRAM(S): at 12:26

## 2020-12-08 RX ADMIN — SODIUM CHLORIDE 100 MILLILITER(S): 9 INJECTION INTRAMUSCULAR; INTRAVENOUS; SUBCUTANEOUS at 17:05

## 2020-12-08 NOTE — PROGRESS NOTE ADULT - PROBLEM SELECTOR PLAN 2
Clinically improved. Pt empirically started on ceftriaxone/azithro --> transitioned now to levofloxacin. COVID-19 however swab neg x 2. Pt to complete 5 day course of abx for community acquired PNA  - given levofloxacin 750mg on 12/7. Given pt with CrCl 23, pt to receive next and final dose on 12/9/20

## 2020-12-08 NOTE — PROVIDER CONTACT NOTE (OTHER) - ACTION/TREATMENT ORDERED:
Per ACP, continue to bladder scan and straight cath per protocol if patient is retaining urine. Will discuss tonight with night RN.

## 2020-12-08 NOTE — PROGRESS NOTE ADULT - PROBLEM SELECTOR PLAN 5
Resolved. Patient with respiratory alkalosis + AG acidosis (VBG pH normal). Etiology of AG acidosis unclear - presumed possibly 2/2 renal failure but likely due to acute illness and decreased oral intake

## 2020-12-08 NOTE — PROGRESS NOTE ADULT - ASSESSMENT
89F history of CAD s/p 3V CABG, Dm2, HTN, Hyperlipidemia, admitted for elevated Troponin level 2/2 demand ischemia in the setting of community acquired PNA. COVID-19 infection r/o x2. Now with signs of hypovolemia on labs due to poor appetite and oral intake.

## 2020-12-08 NOTE — PROGRESS NOTE ADULT - SUBJECTIVE AND OBJECTIVE BOX
Patient is a 89y old  Female who presents with a chief complaint of Community acquired PNA (07 Dec 2020 13:33)    SUBJECTIVE / OVERNIGHT EVENTS:  Patient without any pain or SOB. She was able to sit in a chair for about 45 min after working with PT. She still has generalized weakness with poor appetite and minimal oral intake. No fever, chest pain, n/v or abdominal pain.     MEDICATIONS  (STANDING):  aspirin  chewable 81 milliGRAM(s) Oral daily  dextrose 40% Gel 15 Gram(s) Oral once  dextrose 5%. 1000 milliLiter(s) (50 mL/Hr) IV Continuous <Continuous>  dextrose 5%. 1000 milliLiter(s) (100 mL/Hr) IV Continuous <Continuous>  dextrose 50% Injectable 25 Gram(s) IV Push once  dextrose 50% Injectable 12.5 Gram(s) IV Push once  dextrose 50% Injectable 25 Gram(s) IV Push once  enoxaparin Injectable 30 milliGRAM(s) SubCutaneous daily  glucagon  Injectable 1 milliGRAM(s) IntraMuscular once  insulin lispro (ADMELOG) corrective regimen sliding scale   SubCutaneous three times a day before meals  insulin lispro (ADMELOG) corrective regimen sliding scale   SubCutaneous at bedtime  lactobacillus acidophilus 1 Tablet(s) Oral two times a day  metoprolol tartrate 50 milliGRAM(s) Oral two times a day  simvastatin 40 milliGRAM(s) Oral at bedtime  sodium chloride 0.9%. 1000 milliLiter(s) (75 mL/Hr) IV Continuous <Continuous>    MEDICATIONS  (PRN):  acetaminophen   Tablet .. 650 milliGRAM(s) Oral every 6 hours PRN Temp greater or equal to 38C (100.4F), Mild Pain (1 - 3), Moderate Pain (4 - 6)      Vital Signs Last 24 Hrs  T(C): 36.3 (08 Dec 2020 11:28), Max: 36.4 (08 Dec 2020 05:39)  T(F): 97.3 (08 Dec 2020 11:28), Max: 97.5 (08 Dec 2020 05:39)  HR: 109 (08 Dec 2020 11:28) (97 - 109)  BP: 113/55 (08 Dec 2020 11:28) (101/51 - 113/55)  RR: 16 (08 Dec 2020 11:28) (16 - 16)  SpO2: 97% (08 Dec 2020 11:28) (96% - 97%)      CAPILLARY BLOOD GLUCOSE  POCT Blood Glucose.: 126 mg/dL (08 Dec 2020 11:58)  POCT Blood Glucose.: 85 mg/dL (08 Dec 2020 08:30)  POCT Blood Glucose.: 112 mg/dL (07 Dec 2020 21:04)  POCT Blood Glucose.: 120 mg/dL (07 Dec 2020 17:22)      I&O's Summary    07 Dec 2020 07:01  -  08 Dec 2020 07:00  --------------------------------------------------------  IN: 1500 mL / OUT: 600 mL / NET: 900 mL    08 Dec 2020 07:01  -  08 Dec 2020 13:58  --------------------------------------------------------  IN: 120 mL / OUT: 0 mL / NET: 120 mL          PHYSICAL EXAM  GENERAL: NAD, small framed, non-toxic appearing, appeared younger than stated age but fatigued  CHEST/LUNG: Clear to auscultation bilaterally; No wheeze  HEART: Regular rate and rhythm; No murmurs, rubs, or gallops  ABDOMEN: Soft, Nontender, distended  EXTREMITIES:  2+ Peripheral Pulses, No clubbing, cyanosis, or edema  PSYCH: AAOx3  SKIN: scattered ecchymoses on extremities.         LABS:                        12.6   13.39 )-----------( 243      ( 08 Dec 2020 06:51 )             38.9     12-08    131<L>  |  98  |  64<H>  ----------------------------<  95  4.0   |  17<L>  |  1.27    Ca    9.6      08 Dec 2020 07:20  Phos  2.6     12-08  Mg     2.2     12-08

## 2020-12-08 NOTE — PROGRESS NOTE ADULT - PROBLEM SELECTOR PLAN 3
Improving, but not at baseline functional status. Initially deemed candidate for rehab. However, pt and her daughter would prefer to go home. Pt able to stand but still very weak.  - continued PT sessions while inpatient as tolerated  - OOB to chair

## 2020-12-08 NOTE — PROGRESS NOTE ADULT - PROBLEM SELECTOR PLAN 6
[Dear  ___] : Dear  [unfilled], [Consult Letter:] : I had the pleasure of evaluating your patient, [unfilled]. [Please see my note below.] : Please see my note below. [Consult Closing:] : Thank you very much for allowing me to participate in the care of this patient.  If you have any questions, please do not hesitate to contact me. [Sincerely,] : Sincerely, [FreeTextEntry2] : Dr. Wayne Cantu [FreeTextEntry3] : Dhiraj Schumacher MD\par  for Perioperative Services\par Division of Pediatric General, Thoracic and Endoscopic Surgery\par [facility]\par \par \par  A1c 6.5 - well controlled

## 2020-12-08 NOTE — PROGRESS NOTE ADULT - PROBLEM SELECTOR PLAN 1
Patient with minimal oral intake. Pt dry on exam (dry oral mucous membranes) and labs suggestive of hemoconcentration with noted hyponatremia  - encourage oral supplementation with Glucerna and oral intake as tolerated  - give 1L normal saline bolus.   - repeat routine blood work in the afternoon  - liberalize diet  - monitor I&Os

## 2020-12-08 NOTE — PROVIDER CONTACT NOTE (OTHER) - RECOMMENDATIONS
Bladder Scan completed at 1405, 113mL noted. Bladder scan repeated at 1701, 192ML urine noted. ACP [place] notified. Patient remains on 0.9% sodium chloride at 75mL/hr.

## 2020-12-09 DIAGNOSIS — D72.829 ELEVATED WHITE BLOOD CELL COUNT, UNSPECIFIED: ICD-10-CM

## 2020-12-09 DIAGNOSIS — R33.9 RETENTION OF URINE, UNSPECIFIED: ICD-10-CM

## 2020-12-09 LAB
ANION GAP SERPL CALC-SCNC: 16 MMOL/L — HIGH (ref 7–14)
BUN SERPL-MCNC: 54 MG/DL — HIGH (ref 7–23)
CALCIUM SERPL-MCNC: 9 MG/DL — SIGNIFICANT CHANGE UP (ref 8.4–10.5)
CHLORIDE SERPL-SCNC: 103 MMOL/L — SIGNIFICANT CHANGE UP (ref 98–107)
CO2 SERPL-SCNC: 13 MMOL/L — LOW (ref 22–31)
CREAT SERPL-MCNC: 0.98 MG/DL — SIGNIFICANT CHANGE UP (ref 0.5–1.3)
GLUCOSE BLDC GLUCOMTR-MCNC: 123 MG/DL — HIGH (ref 70–99)
GLUCOSE BLDC GLUCOMTR-MCNC: 124 MG/DL — HIGH (ref 70–99)
GLUCOSE BLDC GLUCOMTR-MCNC: 126 MG/DL — HIGH (ref 70–99)
GLUCOSE BLDC GLUCOMTR-MCNC: 131 MG/DL — HIGH (ref 70–99)
GLUCOSE SERPL-MCNC: 109 MG/DL — HIGH (ref 70–99)
HCT VFR BLD CALC: 36.3 % — SIGNIFICANT CHANGE UP (ref 34.5–45)
HGB BLD-MCNC: 12 G/DL — SIGNIFICANT CHANGE UP (ref 11.5–15.5)
MAGNESIUM SERPL-MCNC: 1.8 MG/DL — SIGNIFICANT CHANGE UP (ref 1.6–2.6)
MCHC RBC-ENTMCNC: 28.7 PG — SIGNIFICANT CHANGE UP (ref 27–34)
MCHC RBC-ENTMCNC: 33.1 GM/DL — SIGNIFICANT CHANGE UP (ref 32–36)
MCV RBC AUTO: 86.8 FL — SIGNIFICANT CHANGE UP (ref 80–100)
NRBC # BLD: 0 /100 WBCS — SIGNIFICANT CHANGE UP
NRBC # FLD: 0 K/UL — SIGNIFICANT CHANGE UP
PHOSPHATE SERPL-MCNC: 2.5 MG/DL — SIGNIFICANT CHANGE UP (ref 2.5–4.5)
PLATELET # BLD AUTO: 223 K/UL — SIGNIFICANT CHANGE UP (ref 150–400)
POTASSIUM SERPL-MCNC: 4.1 MMOL/L — SIGNIFICANT CHANGE UP (ref 3.5–5.3)
POTASSIUM SERPL-SCNC: 4.1 MMOL/L — SIGNIFICANT CHANGE UP (ref 3.5–5.3)
RBC # BLD: 4.18 M/UL — SIGNIFICANT CHANGE UP (ref 3.8–5.2)
RBC # FLD: 14.3 % — SIGNIFICANT CHANGE UP (ref 10.3–14.5)
SODIUM SERPL-SCNC: 132 MMOL/L — LOW (ref 135–145)
WBC # BLD: 15.43 K/UL — HIGH (ref 3.8–10.5)
WBC # FLD AUTO: 15.43 K/UL — HIGH (ref 3.8–10.5)

## 2020-12-09 PROCEDURE — 99233 SBSQ HOSP IP/OBS HIGH 50: CPT

## 2020-12-09 PROCEDURE — 74018 RADEX ABDOMEN 1 VIEW: CPT | Mod: 26

## 2020-12-09 RX ORDER — POLYETHYLENE GLYCOL 3350 17 G/17G
17 POWDER, FOR SOLUTION ORAL DAILY
Refills: 0 | Status: DISCONTINUED | OUTPATIENT
Start: 2020-12-09 | End: 2020-12-11

## 2020-12-09 RX ORDER — SENNA PLUS 8.6 MG/1
2 TABLET ORAL AT BEDTIME
Refills: 0 | Status: DISCONTINUED | OUTPATIENT
Start: 2020-12-09 | End: 2020-12-11

## 2020-12-09 RX ADMIN — SENNA PLUS 2 TABLET(S): 8.6 TABLET ORAL at 21:36

## 2020-12-09 RX ADMIN — LEVOFLOXACIN 750 MILLIGRAM(S): 5 INJECTION, SOLUTION INTRAVENOUS at 05:55

## 2020-12-09 RX ADMIN — ENOXAPARIN SODIUM 30 MILLIGRAM(S): 100 INJECTION SUBCUTANEOUS at 13:37

## 2020-12-09 RX ADMIN — POLYETHYLENE GLYCOL 3350 17 GRAM(S): 17 POWDER, FOR SOLUTION ORAL at 17:07

## 2020-12-09 RX ADMIN — Medication 1 TABLET(S): at 05:56

## 2020-12-09 RX ADMIN — Medication 81 MILLIGRAM(S): at 13:37

## 2020-12-09 RX ADMIN — Medication 1 TABLET(S): at 17:07

## 2020-12-09 RX ADMIN — SIMVASTATIN 40 MILLIGRAM(S): 20 TABLET, FILM COATED ORAL at 21:36

## 2020-12-09 NOTE — CHART NOTE - NSCHARTNOTEFT_GEN_A_CORE
Patient requires semi electric hospital bed 2/2 CAD and failure to thrive. HOB needs to be elevated 30 degrees of more and frequent changes in body positions are required and not feasible in ordinary bed. Also needs gel mattress overlay to prevent skin breakdown.   Patient cannot self propel due to CAD and failure to thrive and cannot utilize walker, cane or crutch to assist in ADLs. Pt needs transport wheelchair to get from doctor's appointments. Caregiver willing to assist.

## 2020-12-09 NOTE — PROGRESS NOTE ADULT - PROBLEM SELECTOR PLAN 2
Unclear etiology. Pt on abx for PNA and remains afebrile. However, given rising WBC count, cannot exclude another infection  - check abdominal Xray given abdominal distension --> if high stool burdern, stercoral colitis may be an etiology  - check UA/urine cx   - defer further infectious w/u at this time unless pt becomes febrile or has other localizing symptoms.

## 2020-12-09 NOTE — PROGRESS NOTE ADULT - PROBLEM SELECTOR PLAN 4
Clinically improved. Pt empirically started on ceftriaxone/azithro --> transitioned now to levofloxacin. COVID-19 however swab neg x 2. Pt to complete 5 day course of abx for community acquired PNA  - given levofloxacin 750mg on 12/7. Given pt with CrCl 23, pt to receive next and final dose today, 12/9/20

## 2020-12-09 NOTE — PROGRESS NOTE ADULT - PROBLEM SELECTOR PLAN 3
Patient with minimal oral intake. Pt dry on exam (dry oral mucous membranes) and labs suggestive of hemoconcentration with noted hyponatremia  - encourage oral supplementation with Glucerna and oral intake as tolerated  - c/w IV fluids. Noted infiltrated IV on left forearm, will replace.  - liberalize diet  - monitor I&Os

## 2020-12-09 NOTE — DISCHARGE NOTE PROVIDER - NSDCMRMEDTOKEN_GEN_ALL_CORE_FT
Aspirin Enteric Coated 325 mg oral delayed release tablet: 1 tab(s) orally once a day  LISINOPRIL  10 MG TABS: tab(s) orally once a day  METFORMIN HYDROCHLORIDE  500 MG TABS: tab(s) orally 2 times a day  METOPROLOL TARTRATE  50 MG TABS: tab(s) orally 2 times a day  rolling walker: rolling walker   SIMVASTATIN  40 MG TABS: tab(s) orally once a day (at bedtime)

## 2020-12-09 NOTE — PROGRESS NOTE ADULT - PROBLEM SELECTOR PLAN 5
Improving, but not at baseline functional status. Initially deemed candidate for rehab. However, pt and her daughter would prefer to go home.   - continued PT sessions while inpatient as tolerated  - OOB to chair

## 2020-12-09 NOTE — PROGRESS NOTE ADULT - ASSESSMENT
89F history of CAD s/p 3V CABG, Dm2, HTN, Hyperlipidemia, admitted for elevated Troponin level 2/2 demand ischemia in the setting of community acquired PNA. COVID-19 infection r/o x2. Hospital course notable for signs of hypovolemia on labs due to poor appetite and oral intake, as well as worsening leukocytosis and now urinary retention.

## 2020-12-09 NOTE — PROVIDER CONTACT NOTE (OTHER) - ACTION/TREATMENT ORDERED:
Continue to monitor urine output with primafit. Bladder scan patient at 10pm if patient does not void. IVF discontinued and Xray abdomen ordered. Continue to monitor urine output with primafit. Bladder scan patient at 10pm if patient does not void.

## 2020-12-09 NOTE — PROGRESS NOTE ADULT - SUBJECTIVE AND OBJECTIVE BOX
Patient is a 89y old  Female who presents with a chief complaint of Generalized weakness x 5 days (09 Dec 2020 10:38)    SUBJECTIVE / OVERNIGHT EVENTS:  Patient with decreased appetite and minimal food intake. Pt expressed feeling depressed by being in the hospital. Patient able to walk down the llanes with walker during PT session today as per her nurse.  Of note, patient with decreased UOP, bedside bladder scanned performed and pt found to be retaining urine. Pt s/p straight cath --> 600cc of urine removed. Patient with fever, chest pain, SOB, n/v or abdominal pain.     MEDICATIONS  (STANDING):  aspirin  chewable 81 milliGRAM(s) Oral daily  dextrose 40% Gel 15 Gram(s) Oral once  dextrose 5%. 1000 milliLiter(s) (50 mL/Hr) IV Continuous <Continuous>  dextrose 5%. 1000 milliLiter(s) (100 mL/Hr) IV Continuous <Continuous>  dextrose 50% Injectable 25 Gram(s) IV Push once  dextrose 50% Injectable 12.5 Gram(s) IV Push once  dextrose 50% Injectable 25 Gram(s) IV Push once  enoxaparin Injectable 30 milliGRAM(s) SubCutaneous daily  glucagon  Injectable 1 milliGRAM(s) IntraMuscular once  insulin lispro (ADMELOG) corrective regimen sliding scale   SubCutaneous three times a day before meals  insulin lispro (ADMELOG) corrective regimen sliding scale   SubCutaneous at bedtime  lactobacillus acidophilus 1 Tablet(s) Oral two times a day  levoFLOXacin  Tablet 750 milliGRAM(s) Oral every 48 hours  metoprolol tartrate 50 milliGRAM(s) Oral two times a day  polyethylene glycol 3350 17 Gram(s) Oral daily  senna 2 Tablet(s) Oral at bedtime  simvastatin 40 milliGRAM(s) Oral at bedtime    MEDICATIONS  (PRN):  acetaminophen   Tablet .. 650 milliGRAM(s) Oral every 6 hours PRN Temp greater or equal to 38C (100.4F), Mild Pain (1 - 3), Moderate Pain (4 - 6)      Vital Signs Last 24 Hrs  T(C): 36.8 (09 Dec 2020 09:30), Max: 36.8 (09 Dec 2020 09:30)  T(F): 98.3 (09 Dec 2020 09:30), Max: 98.3 (09 Dec 2020 09:30)  HR: 73 (09 Dec 2020 09:30) (73 - 109)  BP: 106/57 (09 Dec 2020 09:30) (101/59 - 106/57)  RR: 16 (09 Dec 2020 09:30) (16 - 18)  SpO2: 97% (09 Dec 2020 09:30) (97% - 98%)    CAPILLARY BLOOD GLUCOSE  POCT Blood Glucose.: 131 mg/dL (09 Dec 2020 11:59)  POCT Blood Glucose.: 126 mg/dL (09 Dec 2020 08:31)  POCT Blood Glucose.: 125 mg/dL (08 Dec 2020 21:16)  POCT Blood Glucose.: 130 mg/dL (08 Dec 2020 17:13)    I&O's Summary    08 Dec 2020 07:01  -  09 Dec 2020 07:00  --------------------------------------------------------  IN: 1320 mL / OUT: 0 mL / NET: 1320 mL    09 Dec 2020 07:01  -  09 Dec 2020 14:45  --------------------------------------------------------  IN: 750 mL / OUT: 725 mL / NET: 25 mL          PHYSICAL EXAM  GENERAL: NAD, small framed, non-toxic appearing, appeared younger than stated age but fatigued  CHEST/LUNG: Clear to auscultation bilaterally; No wheeze  HEART: Regular rate and rhythm; No murmurs, rubs, or gallops  ABDOMEN: Soft, Nontender, distended  EXTREMITIES:  2+ Peripheral Pulses, No clubbing, cyanosis, or edema. Superficial swelling of left forearm near IV site  PSYCH: AAOx3, depressed affect, conversant  SKIN: scattered ecchymoses on extremities.     LABS:                        12.0   15.43 )-----------( 223      ( 09 Dec 2020 07:38 )             36.3     12-09    132<L>  |  103  |  54<H>  ----------------------------<  109<H>  4.1   |  13<L>  |  0.98    Ca    9.0      09 Dec 2020 07:26  Phos  2.5     12-09  Mg     1.8     12-09    TPro  x   /  Alb  x   /  TBili  <0.2  /  DBili  x   /  AST  x   /  ALT  x   /  AlkPhos  x   12-09

## 2020-12-09 NOTE — PROGRESS NOTE ADULT - PROBLEM SELECTOR PLAN 1
New finding given decreased UOP  - continue to monitor I&Os. Repeat TOV with bladder scan this evening at 10pm  - obtain UA/urine cx to r/o UTI contributing to retention  - start daily bowel regimen since constipation can exacerbate retention

## 2020-12-10 DIAGNOSIS — C79.9 SECONDARY MALIGNANT NEOPLASM OF UNSPECIFIED SITE: ICD-10-CM

## 2020-12-10 DIAGNOSIS — I47.1 SUPRAVENTRICULAR TACHYCARDIA: ICD-10-CM

## 2020-12-10 LAB
ALBUMIN SERPL ELPH-MCNC: 2.1 G/DL — LOW (ref 3.3–5)
ALP SERPL-CCNC: 95 U/L — SIGNIFICANT CHANGE UP (ref 40–120)
ALT FLD-CCNC: 14 U/L — SIGNIFICANT CHANGE UP (ref 4–33)
ANION GAP SERPL CALC-SCNC: 13 MMOL/L — SIGNIFICANT CHANGE UP (ref 7–14)
APPEARANCE UR: CLEAR — SIGNIFICANT CHANGE UP
APTT BLD: 28.6 SEC — SIGNIFICANT CHANGE UP (ref 27–36.3)
AST SERPL-CCNC: 17 U/L — SIGNIFICANT CHANGE UP (ref 4–32)
BACTERIA # UR AUTO: ABNORMAL
BASE EXCESS BLDV CALC-SCNC: -8.9 MMOL/L — LOW (ref -3–2)
BASOPHILS # BLD AUTO: 0 K/UL — SIGNIFICANT CHANGE UP (ref 0–0.2)
BASOPHILS # BLD AUTO: 0.02 K/UL — SIGNIFICANT CHANGE UP (ref 0–0.2)
BASOPHILS NFR BLD AUTO: 0 % — SIGNIFICANT CHANGE UP (ref 0–2)
BASOPHILS NFR BLD AUTO: 0.1 % — SIGNIFICANT CHANGE UP (ref 0–2)
BILIRUB SERPL-MCNC: <0.2 MG/DL — SIGNIFICANT CHANGE UP (ref 0.2–1.2)
BILIRUB UR-MCNC: NEGATIVE — SIGNIFICANT CHANGE UP
BLOOD GAS VENOUS - CREATININE: 1.5 MG/DL — HIGH (ref 0.5–1.3)
BLOOD GAS VENOUS COMPREHENSIVE RESULT: SIGNIFICANT CHANGE UP
BLOOD GAS VENOUS COMPREHENSIVE RESULT: SIGNIFICANT CHANGE UP
BUN SERPL-MCNC: 58 MG/DL — HIGH (ref 7–23)
CALCIUM SERPL-MCNC: 9.1 MG/DL — SIGNIFICANT CHANGE UP (ref 8.4–10.5)
CHLORIDE BLDV-SCNC: 109 MMOL/L — HIGH (ref 96–108)
CHLORIDE SERPL-SCNC: 103 MMOL/L — SIGNIFICANT CHANGE UP (ref 98–107)
CK MB BLD-MCNC: 18.5 % — HIGH (ref 0–2.5)
CK MB CFR SERPL CALC: 11.5 NG/ML — HIGH
CK SERPL-CCNC: 62 U/L — SIGNIFICANT CHANGE UP (ref 25–170)
CO2 SERPL-SCNC: 17 MMOL/L — LOW (ref 22–31)
COLOR SPEC: YELLOW — SIGNIFICANT CHANGE UP
COMMENT - URINE: SIGNIFICANT CHANGE UP
CREAT SERPL-MCNC: 1.27 MG/DL — SIGNIFICANT CHANGE UP (ref 0.5–1.3)
CULTURE RESULTS: SIGNIFICANT CHANGE UP
CULTURE RESULTS: SIGNIFICANT CHANGE UP
DIFF PNL FLD: ABNORMAL
EOSINOPHIL # BLD AUTO: 0 K/UL — SIGNIFICANT CHANGE UP (ref 0–0.5)
EOSINOPHIL # BLD AUTO: 0.16 K/UL — SIGNIFICANT CHANGE UP (ref 0–0.5)
EOSINOPHIL NFR BLD AUTO: 0 % — SIGNIFICANT CHANGE UP (ref 0–6)
EOSINOPHIL NFR BLD AUTO: 0.8 % — SIGNIFICANT CHANGE UP (ref 0–6)
EPI CELLS # UR: SIGNIFICANT CHANGE UP /HPF (ref 0–5)
GAS PNL BLDV: 131 MMOL/L — LOW (ref 136–146)
GLUCOSE BLDC GLUCOMTR-MCNC: 110 MG/DL — HIGH (ref 70–99)
GLUCOSE BLDC GLUCOMTR-MCNC: 111 MG/DL — HIGH (ref 70–99)
GLUCOSE BLDC GLUCOMTR-MCNC: 115 MG/DL — HIGH (ref 70–99)
GLUCOSE BLDC GLUCOMTR-MCNC: 133 MG/DL — HIGH (ref 70–99)
GLUCOSE BLDC GLUCOMTR-MCNC: 97 MG/DL — SIGNIFICANT CHANGE UP (ref 70–99)
GLUCOSE BLDV-MCNC: 99 MG/DL — SIGNIFICANT CHANGE UP (ref 70–99)
GLUCOSE SERPL-MCNC: 103 MG/DL — HIGH (ref 70–99)
GLUCOSE UR QL: NEGATIVE — SIGNIFICANT CHANGE UP
HCO3 BLDV-SCNC: 17 MMOL/L — LOW (ref 20–27)
HCT VFR BLD CALC: 36.9 % — SIGNIFICANT CHANGE UP (ref 34.5–45)
HCT VFR BLD CALC: 38.2 % — SIGNIFICANT CHANGE UP (ref 34.5–45)
HCT VFR BLDA CALC: 41.4 % — SIGNIFICANT CHANGE UP (ref 34.5–46.5)
HGB BLD CALC-MCNC: 13.5 G/DL — SIGNIFICANT CHANGE UP (ref 11.5–15.5)
HGB BLD-MCNC: 11.9 G/DL — SIGNIFICANT CHANGE UP (ref 11.5–15.5)
HGB BLD-MCNC: 12.7 G/DL — SIGNIFICANT CHANGE UP (ref 11.5–15.5)
HYALINE CASTS # UR AUTO: SIGNIFICANT CHANGE UP /LPF (ref 0–7)
IANC: 15.76 K/UL — HIGH (ref 1.5–8.5)
IANC: 16.58 K/UL — HIGH (ref 1.5–8.5)
IMM GRANULOCYTES NFR BLD AUTO: 8.5 % — HIGH (ref 0–1.5)
INR BLD: 1.13 RATIO — SIGNIFICANT CHANGE UP (ref 0.88–1.17)
KETONES UR-MCNC: ABNORMAL
LACTATE BLDV-MCNC: 2.4 MMOL/L — HIGH (ref 0.5–2)
LEUKOCYTE ESTERASE UR-ACNC: NEGATIVE — SIGNIFICANT CHANGE UP
LYMPHOCYTES # BLD AUTO: 0.64 K/UL — LOW (ref 1–3.3)
LYMPHOCYTES # BLD AUTO: 1.17 K/UL — SIGNIFICANT CHANGE UP (ref 1–3.3)
LYMPHOCYTES # BLD AUTO: 3.3 % — LOW (ref 13–44)
LYMPHOCYTES # BLD AUTO: 5.4 % — LOW (ref 13–44)
MAGNESIUM SERPL-MCNC: 1.8 MG/DL — SIGNIFICANT CHANGE UP (ref 1.6–2.6)
MCHC RBC-ENTMCNC: 28.1 PG — SIGNIFICANT CHANGE UP (ref 27–34)
MCHC RBC-ENTMCNC: 28.7 PG — SIGNIFICANT CHANGE UP (ref 27–34)
MCHC RBC-ENTMCNC: 32.2 GM/DL — SIGNIFICANT CHANGE UP (ref 32–36)
MCHC RBC-ENTMCNC: 33.2 GM/DL — SIGNIFICANT CHANGE UP (ref 32–36)
MCV RBC AUTO: 86.4 FL — SIGNIFICANT CHANGE UP (ref 80–100)
MCV RBC AUTO: 87 FL — SIGNIFICANT CHANGE UP (ref 80–100)
MONOCYTES # BLD AUTO: 0.39 K/UL — SIGNIFICANT CHANGE UP (ref 0–0.9)
MONOCYTES # BLD AUTO: 1.12 K/UL — HIGH (ref 0–0.9)
MONOCYTES NFR BLD AUTO: 1.8 % — LOW (ref 2–14)
MONOCYTES NFR BLD AUTO: 5.8 % — SIGNIFICANT CHANGE UP (ref 2–14)
NEUTROPHILS # BLD AUTO: 15.76 K/UL — HIGH (ref 1.8–7.4)
NEUTROPHILS # BLD AUTO: 20.15 K/UL — HIGH (ref 1.8–7.4)
NEUTROPHILS NFR BLD AUTO: 81.5 % — HIGH (ref 43–77)
NEUTROPHILS NFR BLD AUTO: 89.2 % — HIGH (ref 43–77)
NITRITE UR-MCNC: NEGATIVE — SIGNIFICANT CHANGE UP
NRBC # BLD: 0 /100 WBCS — SIGNIFICANT CHANGE UP
NRBC # FLD: 0 K/UL — SIGNIFICANT CHANGE UP
NT-PROBNP SERPL-SCNC: 7263 PG/ML — HIGH
PCO2 BLDV: 35 MMHG — LOW (ref 41–51)
PH BLDV: 7.29 — LOW (ref 7.32–7.43)
PH UR: 5.5 — SIGNIFICANT CHANGE UP (ref 5–8)
PHOSPHATE SERPL-MCNC: 3.2 MG/DL — SIGNIFICANT CHANGE UP (ref 2.5–4.5)
PLATELET # BLD AUTO: 213 K/UL — SIGNIFICANT CHANGE UP (ref 150–400)
PLATELET # BLD AUTO: 235 K/UL — SIGNIFICANT CHANGE UP (ref 150–400)
PO2 BLDV: 40 MMHG — SIGNIFICANT CHANGE UP (ref 35–40)
POTASSIUM BLDV-SCNC: 4.3 MMOL/L — SIGNIFICANT CHANGE UP (ref 3.4–4.5)
POTASSIUM SERPL-MCNC: 4.5 MMOL/L — SIGNIFICANT CHANGE UP (ref 3.5–5.3)
POTASSIUM SERPL-SCNC: 4.5 MMOL/L — SIGNIFICANT CHANGE UP (ref 3.5–5.3)
PROT SERPL-MCNC: 4.9 G/DL — LOW (ref 6–8.3)
PROT UR-MCNC: ABNORMAL
PROTHROM AB SERPL-ACNC: 12.9 SEC — SIGNIFICANT CHANGE UP (ref 9.8–13.1)
RBC # BLD: 4.24 M/UL — SIGNIFICANT CHANGE UP (ref 3.8–5.2)
RBC # BLD: 4.42 M/UL — SIGNIFICANT CHANGE UP (ref 3.8–5.2)
RBC # FLD: 14.8 % — HIGH (ref 10.3–14.5)
RBC # FLD: 14.8 % — HIGH (ref 10.3–14.5)
RBC CASTS # UR COMP ASSIST: <5 /HPF — SIGNIFICANT CHANGE UP (ref 0–4)
SAO2 % BLDV: 63 % — SIGNIFICANT CHANGE UP (ref 60–85)
SODIUM SERPL-SCNC: 133 MMOL/L — LOW (ref 135–145)
SP GR SPEC: 1.02 — SIGNIFICANT CHANGE UP (ref 1.01–1.02)
SPECIMEN SOURCE: SIGNIFICANT CHANGE UP
SPECIMEN SOURCE: SIGNIFICANT CHANGE UP
TROPONIN T, HIGH SENSITIVITY RESULT: 231 NG/L — CRITICAL HIGH
UROBILINOGEN FLD QL: SIGNIFICANT CHANGE UP
WBC # BLD: 19.35 K/UL — HIGH (ref 3.8–10.5)
WBC # BLD: 21.71 K/UL — HIGH (ref 3.8–10.5)
WBC # FLD AUTO: 19.35 K/UL — HIGH (ref 3.8–10.5)
WBC # FLD AUTO: 21.71 K/UL — HIGH (ref 3.8–10.5)
WBC UR QL: <5 /HPF — SIGNIFICANT CHANGE UP (ref 0–5)

## 2020-12-10 PROCEDURE — 93010 ELECTROCARDIOGRAM REPORT: CPT | Mod: 76

## 2020-12-10 PROCEDURE — 74018 RADEX ABDOMEN 1 VIEW: CPT | Mod: 26

## 2020-12-10 PROCEDURE — 99223 1ST HOSP IP/OBS HIGH 75: CPT

## 2020-12-10 PROCEDURE — 99233 SBSQ HOSP IP/OBS HIGH 50: CPT

## 2020-12-10 PROCEDURE — 71275 CT ANGIOGRAPHY CHEST: CPT | Mod: 26

## 2020-12-10 PROCEDURE — 71045 X-RAY EXAM CHEST 1 VIEW: CPT | Mod: 26

## 2020-12-10 PROCEDURE — 70450 CT HEAD/BRAIN W/O DYE: CPT | Mod: 26

## 2020-12-10 RX ORDER — ALBUTEROL 90 UG/1
2 AEROSOL, METERED ORAL ONCE
Refills: 0 | Status: COMPLETED | OUTPATIENT
Start: 2020-12-10 | End: 2020-12-11

## 2020-12-10 RX ORDER — SODIUM CHLORIDE 9 MG/ML
250 INJECTION INTRAMUSCULAR; INTRAVENOUS; SUBCUTANEOUS ONCE
Refills: 0 | Status: COMPLETED | OUTPATIENT
Start: 2020-12-10 | End: 2020-12-10

## 2020-12-10 RX ORDER — SODIUM CHLORIDE 9 MG/ML
500 INJECTION INTRAMUSCULAR; INTRAVENOUS; SUBCUTANEOUS ONCE
Refills: 0 | Status: COMPLETED | OUTPATIENT
Start: 2020-12-10 | End: 2020-12-10

## 2020-12-10 RX ORDER — SCOPALAMINE 1 MG/3D
1 PATCH, EXTENDED RELEASE TRANSDERMAL ONCE
Refills: 0 | Status: DISCONTINUED | OUTPATIENT
Start: 2020-12-10 | End: 2020-12-11

## 2020-12-10 RX ORDER — METOPROLOL TARTRATE 50 MG
50 TABLET ORAL
Refills: 0 | Status: DISCONTINUED | OUTPATIENT
Start: 2020-12-10 | End: 2020-12-11

## 2020-12-10 RX ORDER — MIDODRINE HYDROCHLORIDE 2.5 MG/1
5 TABLET ORAL ONCE
Refills: 0 | Status: COMPLETED | OUTPATIENT
Start: 2020-12-10 | End: 2020-12-10

## 2020-12-10 RX ADMIN — POLYETHYLENE GLYCOL 3350 17 GRAM(S): 17 POWDER, FOR SOLUTION ORAL at 11:29

## 2020-12-10 RX ADMIN — SODIUM CHLORIDE 500 MILLILITER(S): 9 INJECTION INTRAMUSCULAR; INTRAVENOUS; SUBCUTANEOUS at 03:38

## 2020-12-10 RX ADMIN — Medication 1 TABLET(S): at 05:03

## 2020-12-10 RX ADMIN — SODIUM CHLORIDE 1000 MILLILITER(S): 9 INJECTION INTRAMUSCULAR; INTRAVENOUS; SUBCUTANEOUS at 18:37

## 2020-12-10 RX ADMIN — Medication 50 MILLIGRAM(S): at 11:28

## 2020-12-10 RX ADMIN — ENOXAPARIN SODIUM 30 MILLIGRAM(S): 100 INJECTION SUBCUTANEOUS at 11:29

## 2020-12-10 RX ADMIN — Medication 81 MILLIGRAM(S): at 11:28

## 2020-12-10 RX ADMIN — SIMVASTATIN 40 MILLIGRAM(S): 20 TABLET, FILM COATED ORAL at 22:12

## 2020-12-10 RX ADMIN — SENNA PLUS 2 TABLET(S): 8.6 TABLET ORAL at 22:12

## 2020-12-10 RX ADMIN — Medication 1 TABLET(S): at 17:30

## 2020-12-10 NOTE — PROGRESS NOTE ADULT - PROBLEM SELECTOR PLAN 2
Reviewed EKG and telemetry strip. Pt initially with sinus tach with HR in 130s and went into SVT with aberrancy ~3:57am-4:01am this AM. Pt s/p metoprolol 2.5mg IV. Pt now in sinus tach with HR in low 100s. Likely provoked by underlying malignancy.  Likely cause for elevated troponin levels causing type II MI. No need to manage as ACS at this time.  - cardiology consulted  - continue with continuous cardiac monitoring

## 2020-12-10 NOTE — PROGRESS NOTE ADULT - PROBLEM SELECTOR PROBLEM 1
Elevated troponin
Elevated troponin
Hypovolemia due to dehydration
Metastatic malignant neoplasm, unspecified site
Elevated troponin
Urinary retention

## 2020-12-10 NOTE — CONSULT NOTE ADULT - ATTENDING COMMENTS
Personally saw and examined patient  Labs and vitals reviewed  Agree with above assessment and plan  89F w CAD/CABG, DM2, HTN, Hyperlipidemia, admitted for elevated Troponin level 2/2 demand ischemia in the setting of initially presumed community acquired PNA.  found to have diffuse metastatic disease, overnight with episode of VT  pt feels well, denies cp, sob, syncope, loc during event but pt poor historian. currently in SR. tele reviewed, pt in VT up to 200s bpm.   pt with known CAD, likely scar mediated. pt does not appear to be volume overloaded, however pt with active infection (of which she is aware) and active metastatic disease (she is not currently aware, family meeting scheduled for later today, per discussions with primary team).  low suspicion for acs here, pt without cp. CK/CKmb not suggestive of acs, likely trop leak in setting of several minutes of fast VT.    tte pending, will f/u  would cont BB and asa. given advanced malignancy, and pending family GOC discussions, agree with medical mgmt.

## 2020-12-10 NOTE — CHART NOTE - NSCHARTNOTEFT_GEN_A_CORE
Alerted by RN that patient tachycardic sustaining in the 130-140s. Increasing O2 demand from RA to 4L NC    Patient seen and evaluated at bedside, Patient in no acute distress and denies any pain. Patient only saying "I want to sleep." Patient denies any chest pain, SOB, weakness, diaphoresis, abdominal pain, N/V/F/D, recent bowel movements,     CBC, CMP, VBG, coags   Straight cath: 200ccs output  No rectal temp   EKG: Sinus tachycardia @134 BPM TWI  1L fluids ordered  2.5 mg metoprolol given   CT chest w/ IV con r/o PE  Monitor on tele  continuous pulse ox      RRT called for patient HR sustaining in 200-210s. pt asymptomatic. Alerted by RN that patient tachycardic sustaining in the 130-140s. Increasing O2 demand from RA to 4L NC. Patient seen and evaluated at bedside. Patient in no acute distress and denies any pain. Patient only saying "I want to sleep." Patient denies any chest pain, weakness, LOC, HA, new blurred vision, diaphoresis, abdominal pain, N/V/F/D, recent bowel movements, hematuria, melena, cough, wheezing. At time of exam patient's HR increased to 200-210s. RRT was called for HR sustaining in the 200s, however patient asymptomatic. During RRT.......        CBC, CMP, VBG, coags   Straight cath: 200ccs output  abdominal xray   No rectal temp   EKG: Sinus tachycardia @134 BPM TWI  1L fluids ordered  2.5 mg metoprolol given   CT chest w/ IV con r/o PE  Monitor on tele  Continuous pulse ox  Cardio c/s in AM Alerted by RN that patient tachycardic sustaining in the 130-140s. Increasing O2 demand from RA to 4L NC. Patient seen and evaluated at bedside. Patient in no acute distress and denies any pain. Patient only saying "I want to sleep." Patient denies any chest pain, weakness, LOC, HA, new blurred vision, diaphoresis, abdominal pain, N/V/F/D, recent bowel movements, hematuria, melena, cough, wheezing. At time of exam patient's HR increased to 200-210s. RRT was called for HR sustaining in the 200s, however patient asymptomatic. During RRT patient given 2.5mg lopressor. s/p lopressor HR improved to 100-110s, with moderate effect on BP. Systolic BP decreased to the 80s. Patient given 1L total of fluids. with SBP sustaining in the 80s-90s. After RRT, patient's daughter was called for consent for CTA chest w/ IV cont to r/o PE. Patient taken down to CT pending read. s/p CT patient noted to have redness surrounding IV site where contrast was injected. RN advised to pull IV and insert new IV at another location. Site of redness is cool to touch, patient denies any pain, tenderness or warmth to the area. RN told to draw [place] around area of redness to monitor for improvement or expansion. Please f/u RRT note for more information pertaining to the rapid response.      Plan:   CBC, CMP, VBG, coags   Straight cath: 200ccs output  abdominal xray   No rectal temp   EKG: Sinus tachycardia @134 BPM TWI unchanged from previous EKG  1L fluids ordered  2.5 mg metoprolol given   CT chest w/ IV con r/o PE  Monitor on tele  Continuous pulse ox  Cardio c/s in AM  IV pulled, and site of redness circled. Monitor rash.

## 2020-12-10 NOTE — PROGRESS NOTE ADULT - PROBLEM SELECTOR PROBLEM 4
R/O Community acquired pneumonia
Increased anion gap metabolic acidosis
CAD (coronary artery disease)
Increased anion gap metabolic acidosis
Increased anion gap metabolic acidosis
Pneumonia due to infectious organism, unspecified laterality, unspecified part of lung

## 2020-12-10 NOTE — PROGRESS NOTE ADULT - SUBJECTIVE AND OBJECTIVE BOX
Patient is a 89y old  Female who presents with a chief complaint of Generalized weakness x 5 days (09 Dec 2020 10:38)        SUBJECTIVE / OVERNIGHT EVENTS:      MEDICATIONS  (STANDING):  aspirin  chewable 81 milliGRAM(s) Oral daily  dextrose 40% Gel 15 Gram(s) Oral once  dextrose 5%. 1000 milliLiter(s) (50 mL/Hr) IV Continuous <Continuous>  dextrose 5%. 1000 milliLiter(s) (100 mL/Hr) IV Continuous <Continuous>  dextrose 50% Injectable 25 Gram(s) IV Push once  dextrose 50% Injectable 12.5 Gram(s) IV Push once  dextrose 50% Injectable 25 Gram(s) IV Push once  enoxaparin Injectable 30 milliGRAM(s) SubCutaneous daily  glucagon  Injectable 1 milliGRAM(s) IntraMuscular once  insulin lispro (ADMELOG) corrective regimen sliding scale   SubCutaneous three times a day before meals  insulin lispro (ADMELOG) corrective regimen sliding scale   SubCutaneous at bedtime  lactobacillus acidophilus 1 Tablet(s) Oral two times a day  levoFLOXacin  Tablet 750 milliGRAM(s) Oral every 48 hours  metoprolol tartrate 50 milliGRAM(s) Oral two times a day  polyethylene glycol 3350 17 Gram(s) Oral daily  senna 2 Tablet(s) Oral at bedtime  simvastatin 40 milliGRAM(s) Oral at bedtime    MEDICATIONS  (PRN):  acetaminophen   Tablet .. 650 milliGRAM(s) Oral every 6 hours PRN Temp greater or equal to 38C (100.4F), Mild Pain (1 - 3), Moderate Pain (4 - 6)      Vital Signs Last 24 Hrs  T(C): 36.5 (10 Dec 2020 03:33), Max: 36.9 (09 Dec 2020 21:34)  T(F): 97.7 (10 Dec 2020 03:33), Max: 98.5 (09 Dec 2020 21:34)  HR: 109 (10 Dec 2020 05:00) (68 - 140)  BP: 94/55 (10 Dec 2020 05:00) (93/56 - 135/78)  BP(mean): --  RR: 18 (10 Dec 2020 05:00) (17 - 22)  SpO2: 99% (10 Dec 2020 05:00) (92% - 99%)  CAPILLARY BLOOD GLUCOSE      POCT Blood Glucose.: 115 mg/dL (10 Dec 2020 08:41)  POCT Blood Glucose.: 133 mg/dL (10 Dec 2020 04:04)  POCT Blood Glucose.: 123 mg/dL (09 Dec 2020 21:15)  POCT Blood Glucose.: 124 mg/dL (09 Dec 2020 17:30)  POCT Blood Glucose.: 131 mg/dL (09 Dec 2020 11:59)    I&O's Summary    09 Dec 2020 07:01  -  10 Dec 2020 07:00  --------------------------------------------------------  IN: 1330 mL / OUT: 725 mL / NET: 605 mL          PHYSICAL EXAM  GENERAL: NAD, well-developed  HEAD:  Atraumatic, Normocephalic  EYES: EOMI, PERRLA, conjunctiva and sclera clear  NECK: Supple, No JVD  CHEST/LUNG: Clear to auscultation bilaterally; No wheeze  HEART: Regular rate and rhythm; No murmurs, rubs, or gallops  ABDOMEN: Soft, Nontender, Nondistended; Bowel sounds present  EXTREMITIES:  2+ Peripheral Pulses, No clubbing, cyanosis, or edema  PSYCH: AAOx3  SKIN: No rashes or lesions    LABS:                        11.9   21.71 )-----------( 235      ( 10 Dec 2020 05:34 )             36.9     12-    132<L>  |  103  |  54<H>  ----------------------------<  109<H>  4.1   |  13<L>  |  0.98    Ca    9.0      09 Dec 2020 07:26  Phos  2.5     12-  Mg     1.8     12-    TPro  x   /  Alb  x   /  TBili  <0.2  /  DBili  x   /  AST  x   /  ALT  x   /  AlkPhos  x   12-09    PT/INR - ( 10 Dec 2020 05:34 )   PT: 12.9 sec;   INR: 1.13 ratio         PTT - ( 10 Dec 2020 05:34 )  PTT:28.6 sec      Urinalysis Basic - ( 10 Dec 2020 05:52 )    Color: Yellow / Appearance: Clear / S.021 / pH: x  Gluc: x / Ketone: Trace  / Bili: Negative / Urobili: <2 mg/dL   Blood: x / Protein: Trace / Nitrite: Negative   Leuk Esterase: Negative / RBC: <5 /HPF / WBC <5 /HPF   Sq Epi: x / Non Sq Epi: 0-5 /HPF / Bacteria: Few        RADIOLOGY & ADDITIONAL TESTS:    Imaging Personally Reviewed:  Consultant(s) Notes Reviewed:    Care Discussed with Consultants/Other Providers:   Patient is a 89y old  Female who presents with a chief complaint of Generalized weakness x 5 days (09 Dec 2020 10:38)    SUBJECTIVE / OVERNIGHT EVENTS:  Patient with RRT called overnight for acute hypoxia and tachycardia. Pt with HR in low 200s for about 4 mins noted on telemetry with rhythm strip most consistent with SVT with aberrancy. Pt received metoprolol 2.5mg IV x1 dose. Patient stated throughout the episode she felt okay - she denied feeling lightheaded, SOB, having palpitations at the time.  This morning, patient continues to deny any symptoms of SOB, palpitations, chest pain, n/v or abdominal pain. She stated she just has decreased appetite but is thirsty. No BMs yet.     MEDICATIONS  (STANDING):  aspirin  chewable 81 milliGRAM(s) Oral daily  dextrose 40% Gel 15 Gram(s) Oral once  dextrose 5%. 1000 milliLiter(s) (50 mL/Hr) IV Continuous <Continuous>  dextrose 5%. 1000 milliLiter(s) (100 mL/Hr) IV Continuous <Continuous>  dextrose 50% Injectable 25 Gram(s) IV Push once  dextrose 50% Injectable 12.5 Gram(s) IV Push once  dextrose 50% Injectable 25 Gram(s) IV Push once  enoxaparin Injectable 30 milliGRAM(s) SubCutaneous daily  glucagon  Injectable 1 milliGRAM(s) IntraMuscular once  insulin lispro (ADMELOG) corrective regimen sliding scale   SubCutaneous three times a day before meals  insulin lispro (ADMELOG) corrective regimen sliding scale   SubCutaneous at bedtime  lactobacillus acidophilus 1 Tablet(s) Oral two times a day  levoFLOXacin  Tablet 750 milliGRAM(s) Oral every 48 hours  metoprolol tartrate 50 milliGRAM(s) Oral two times a day  polyethylene glycol 3350 17 Gram(s) Oral daily  senna 2 Tablet(s) Oral at bedtime  simvastatin 40 milliGRAM(s) Oral at bedtime    MEDICATIONS  (PRN):  acetaminophen   Tablet .. 650 milliGRAM(s) Oral every 6 hours PRN Temp greater or equal to 38C (100.4F), Mild Pain (1 - 3), Moderate Pain (4 - 6)      Vital Signs Last 24 Hrs  T(C): 36.5 (10 Dec 2020 03:33), Max: 36.9 (09 Dec 2020 21:34)  T(F): 97.7 (10 Dec 2020 03:33), Max: 98.5 (09 Dec 2020 21:34)  HR: 109 (10 Dec 2020 05:00) (68 - 140)  BP: 94/55 (10 Dec 2020 05:00) (93/56 - 135/78)  RR: 18 (10 Dec 2020 05:00) (17 - 22)  SpO2: 99% (10 Dec 2020 05:00) (92% - 99%)      CAPILLARY BLOOD GLUCOSE  POCT Blood Glucose.: 115 mg/dL (10 Dec 2020 08:41)  POCT Blood Glucose.: 133 mg/dL (10 Dec 2020 04:04)  POCT Blood Glucose.: 123 mg/dL (09 Dec 2020 21:15)  POCT Blood Glucose.: 124 mg/dL (09 Dec 2020 17:30)  POCT Blood Glucose.: 131 mg/dL (09 Dec 2020 11:59)      I&O's Summary    09 Dec 2020 07:01  -  10 Dec 2020 07:00  --------------------------------------------------------  IN: 1330 mL / OUT: 725 mL / NET: 605 mL          PHYSICAL EXAM  GENERAL: NAD, frail, non-toxic appearing, appeared younger than stated age but fatigued, on O2 via NC at 4L with O2 sat >95%  CHEST/LUNG: Clear to auscultation bilaterally; No wheeze  HEART: Regular rhythm, tachycardic, No murmurs, rubs, or gallops  ABDOMEN: Soft, Nontender, distended  EXTREMITIES:  2+ Peripheral Pulses, No clubbing, cyanosis, or edema.   PSYCH: AAOx3, depressed affect, conversant, pleasant  SKIN: scattered ecchymoses on extremities.     LABS:                        11.9   21.71 )-----------( 235      ( 10 Dec 2020 05:34 )             36.9     12-09    132<L>  |  103  |  54<H>  ----------------------------<  109<H>  4.1   |  13<L>  |  0.98    Ca    9.0      09 Dec 2020 07:26  Phos  2.5     12-09  Mg     1.8         TPro  x   /  Alb  x   /  TBili  <0.2  /  DBili  x   /  AST  x   /  ALT  x   /  AlkPhos  x       PT/INR - ( 10 Dec 2020 05:34 )   PT: 12.9 sec;   INR: 1.13 ratio         PTT - ( 10 Dec 2020 05:34 )  PTT:28.6 sec      Urinalysis Basic - ( 10 Dec 2020 05:52 )    Color: Yellow / Appearance: Clear / S.021 / pH: x  Gluc: x / Ketone: Trace  / Bili: Negative / Urobili: <2 mg/dL   Blood: x / Protein: Trace / Nitrite: Negative   Leuk Esterase: Negative / RBC: <5 /HPF / WBC <5 /HPF   Sq Epi: x / Non Sq Epi: 0-5 /HPF / Bacteria: Few        RADIOLOGY & ADDITIONAL TESTS:    EXAM:  XR ABDOMEN PORTABLE URGENT 1V    PROCEDURE DATE:  Dec  9 2020     FINDINGS:  Nonspecific bowel gas pattern with gaseous distended loops of small and large bowel throughout the abdomen. There is no supine evidence of intraperitoneal free air. Lumbar degenerative changes and bilateral hip joint osteoarthritis.    IMPRESSION:  Nonspecific bowel gas pattern with gaseous distended loops of small and large bowel throughout the abdomen.      EXAM:  CT ANGIO CHEST (W)AW IC        PROCEDURE DATE:  Dec 10 2020     ******PRELIMINARY REPORT******    ******PRELIMINARY REPORT******            INTERPRETATION:  No pulmonary embolism.  Left upper lobe lung mass measuring 5.5 x 3.8cm extending into the left hilum causing obliteration of the apical posterior segment/subsegmental bronchi.  Multiple bilateral masses/nodules concerning for metastatic disease.  Small bilateral pleural effusions, right greater than left.  Hypoattenuating indeterminate liver lesions with largest measuring 1.4 x 1.2cm(2:100).  A 1.0cm pancreatic neck cystic lesion(2:122) with upstream pancreatic ductal dilation  Indeterminate 2.7 cm left adrenal mass and right adrenal gland thickening.  Multiple peritoneal/retroperitoneal nodularities consistent with carcinomatosis.      Consultant(s) Notes Reviewed:    Care Discussed with Consultants/Other Providers:

## 2020-12-10 NOTE — PROGRESS NOTE ADULT - PROBLEM SELECTOR PLAN 3
New finding given decreased UOP  - continue to monitor I&Os. Repeat TOV with bladder scan this evening at 10pm  - obtain UA/urine cx to r/o UTI contributing to retention  - start daily bowel regimen since constipation can exacerbate retention New finding given decreased UOP. Likely exacerbated by newly noted peritoneal carcinomatosis and constipation. UA not indicative of acute UTI at this time.  - continue to monitor I&Os. PRN bedside bladder scans  - daily bowel regimen since constipation can exacerbate retention

## 2020-12-10 NOTE — PROGRESS NOTE ADULT - PROBLEM SELECTOR PLAN 5
- c/w aspirin 81mg daily and simvastatin 40mg daily - c/w aspirin 81mg daily and simvastatin 40mg daily  - continue with metoprolol tartrate 50mg BID

## 2020-12-10 NOTE — PROGRESS NOTE ADULT - PROBLEM SELECTOR PLAN 1
Incidental finding, unknown primary at this time. Multiple masses and nodules noted on CT angio of chest --> Left upper lobe lung mass measuring 5.5 x 3.8cm extending into the left hilum, bilateral lung masses/nodules, indeterminate liver lesions with largest measuring 1.4 x 1.2cm, Indeterminate 2.7 cm left adrenal mass, and carcinomatosis.  - likely etiology for presenting symptoms, persistent leukocytosis, elevated lactate and failure to thrive  - plan to have advance care discussion with patient and her daughter this afternoon. Pending pt's decision, will likely consult oncology, palliative care/hospice care teams  - f/u final CT angio of chest results.

## 2020-12-10 NOTE — PROGRESS NOTE ADULT - ASSESSMENT
89F history of CAD s/p 3V CABG, Dm2, HTN, Hyperlipidemia, admitted for elevated Troponin level 2/2 demand ischemia in the setting of initially presumed community acquired PNA.  COVID-19 infection r/o x2. Pt s/p empiric abx therapy for PNA. However, hospital course notable for signs of hypovolemia, worsening leukocytosis, urinary retention and now episode of SVT s/p IV metoprolol, which ultimately is likely due to newly diagnosed metastatic disease noted on CT angio of the chest.    89F history of CAD s/p 3V CABG, DM2, HTN, Hyperlipidemia, admitted for elevated Troponin level 2/2 demand ischemia in the setting of initially presumed community acquired PNA.  COVID-19 infection r/o x2. Pt s/p empiric abx therapy for PNA. However, hospital course notable for signs of hypovolemia, worsening leukocytosis, urinary retention and now episode of SVT s/p IV metoprolol, which ultimately is likely due to newly diagnosed metastatic disease noted on CT angio of the chest.

## 2020-12-10 NOTE — PROVIDER CONTACT NOTE (OTHER) - ACTION/TREATMENT ORDERED:
ACP Alexus at bedside assessing patient. Patient is now on 5L NC satting 90-91%. ELENITA Vaughan is ordering CT Head.

## 2020-12-10 NOTE — PROGRESS NOTE ADULT - PROBLEM SELECTOR PROBLEM 9
Other hyperlipidemia
Need for prophylactic measure
Discharge planning issues
Need for prophylactic measure
Discharge planning issues
Need for prophylactic measure

## 2020-12-10 NOTE — PROGRESS NOTE ADULT - PROBLEM SELECTOR PLAN 4
Pt empirically started on ceftriaxone/azithro --> transitioned to levofloxacin, last dose 12/9. COVID-19 swab neg x 2. However, patient likely did not have CAP but initially CXR findings were representative of underlying metastatic disease.  - no need for further abx at this time

## 2020-12-10 NOTE — CONSULT NOTE ADULT - ASSESSMENT
89F history of CAD s/p 3V CABG, DM2, HTN, Hyperlipidemia, admitted for elevated Troponin level 2/2 demand ischemia in the setting of initially presumed community acquired PNA.  COVID-19 infection r/o x2. Pt s/p empiric abx therapy for PNA. However, hospital course notable for signs of hypovolemia, worsening leukocytosis, urinary retention.     Overnight pt was in SVT vs. sinus tachycardia w/demand ischemia on telemetry that subsequently evolved into 2 minutes of sustained ventricular tachycardia w/definitive axis flip at time of evolution. The event resulted in RRT being called, but spontaneously reverted prior to RRT EKG or intervention. RRT EKG concerning for SVT and pt given IV metoprolol that improved HR, but resulted in hypotension. Pt given IVF resuscitation and is now monitored on the floor. Concerns for possible PE resulted in CTA; preliminary read revealed innumerable lung nodules, BL pleural effusions, a large lung mass, a questionable pancreatic mass, and radiological findings c/w carcinomatosis.    Pt now stable on the floor w/o cardiopulmonary complaints and is satting in the mid 90s w/o O2 support (pt not wearing the NC in nostrils and O2 monitoring showing 95% SaO2). Tele continues to show sinus tachycardia in the 100s-110s. Pt w/clinical signs of hypovolemia in the setting of decreased PO intake. Pt has also not been receiving her BB for multiple days due to concerns over low BPs during admission.     Below represents preliminary recommendations for pt management following discussion of pt and review of EKGs and telemetry w/resident physician on cardiology consult and cardiology fellow. Formal discussion of pt to be had on rounds w/attending cardiologist and additional recommendations may result.     #Tachycardia  Pt w/telemetry findings c/w evolution of spontaneous, sustained ventricular tachycardia as indicated by axis flip at time of rapid cardioacceleration. VT self-remitting after ~2m on tele review  Pt showing tachycardia at time of RRT c/w SVT; rates since improved s/p IV lopressor 5mg and IVF 1L    DDx: BB withdrawal vs. hypovolemia vs. multifactorial  - Obtain echocardiogram to assess RV/LV function and central venous pressures; results likely to guide recommendations for IVF  - Restart home BB; if pt cannot tolerate PO, consider converting to IV lopressor q6h. Total BB dosage conversion may be required if pt necessitates IV meds  - Consider IVF hydration if pt continues to have poor PO intake  - c/w telemetry    #ACS r/o  Pt w/ST elevations and reciprocal change during tachycardia w/rates >130bpm during RRT  - ACS unlikely; no current indication for cardiac catheretization   - Likely demand ischemia as ST elevations improved w/rate control and pt denied chest pain at time of event  - f/u troponin from RRT  - obtain EKG and troponins if pt develops chest pain    #Other  - CTA from RRT w/o PE, but overtly concerning for advanced malignancy. Scattered cannonball lesions in BL lungs are concerning for GI origin; pancreatic mass noted in preliminary read  - Contact oncology service for w/u  - Contact radiology or final read 89F history of CAD s/p 3V CABG, DM2, HTN, Hyperlipidemia, admitted for elevated Troponin level 2/2 demand ischemia in the setting of initially presumed community acquired PNA.  COVID-19 infection r/o x2. Pt s/p empiric abx therapy for PNA. However, hospital course notable for signs of hypovolemia, worsening leukocytosis, urinary retention.     Overnight pt was in SVT vs. sinus tachycardia w/demand ischemia on telemetry that subsequently evolved into 2 minutes of sustained ventricular tachycardia w/definitive axis flip at time of evolution. The event resulted in RRT being called, but spontaneously reverted prior to RRT EKG or intervention. RRT EKG concerning for SVT and pt given IV metoprolol that improved HR, but resulted in hypotension. Pt given IVF resuscitation and is now monitored on the floor. Concerns for possible PE resulted in CTA; preliminary read revealed innumerable lung nodules, BL pleural effusions, a large lung mass, a questionable pancreatic mass, and radiological findings c/w carcinomatosis.    Pt now stable on the floor w/o cardiopulmonary complaints and is satting in the mid 90s w/o O2 support (pt not wearing the NC in nostrils and O2 monitoring showing 95% SaO2). Tele continues to show sinus tachycardia in the 100s-110s. Pt w/clinical signs of hypovolemia in the setting of decreased PO intake. Pt has also not been receiving her BB for multiple days due to concerns over low BPs during admission.     Below represents preliminary recommendations for pt management following discussion of pt and review of EKGs and telemetry w/resident physician on cardiology consult and cardiology fellow. Formal discussion of pt to be had on rounds w/attending cardiologist and additional recommendations may result.     #Tachycardia  Pt w/telemetry findings c/w evolution of spontaneous, sustained ventricular tachycardia as indicated by axis flip at time of rapid cardioacceleration. VT self-remitting after ~2m on tele review  Pt showing tachycardia at time of RRT c/w wide complex tachycardia; rates since improved s/p IV lopressor 5mg and IVF 1L    DDx: BB withdrawal vs. hypovolemia vs. multifactorial  - Obtain echocardiogram to assess RV/LV function and central venous pressures; results likely to guide recommendations for IVF  - Restart home BB; if pt cannot tolerate PO, consider converting to IV lopressor q6h. Total BB dosage conversion may be required if pt necessitates IV meds  - Consider IVF hydration if pt continues to have poor PO intake  - c/w telemetry    #ACS r/o  Pt w/ST elevations and reciprocal change during tachycardia w/rates >130bpm during RRT  - ACS unlikely; no current indication for cardiac catheretization   - Likely demand ischemia as ST abnormalities improved w/rate control and pt denied chest pain at time of event  - f/u troponin from RRT  - obtain EKG and troponins if pt develops chest pain    #Other  - CTA from RRT w/o PE, but overtly concerning for advanced malignancy. Innumerable, scattered cannonball lesions in BL lungs are concerning for GI origin; pancreatic mass noted in preliminary read  - Contact oncology service for w/u  - Contact radiology or final read

## 2020-12-10 NOTE — RAPID RESPONSE TEAM SUMMARY - NSADDTLFINDINGSRRT_GEN_ALL_CORE
On arrival patient mildly somnolent but answering questions and following commands appropriately. HR 130s-140s, BP 150s/80s, afebrile rectally 97.7F, mild tachypnea, SpO2 85% on 2L NC, . NC increased to 4L w/ O2 sat 95%. EKG showed sinus tachycardia with diffuse T wave inversions. Pt denies any chest pain. Pt appears volume depleted. Labwork from prior AM significant for increasing leukocytosis and uremia. Pt also noted to have had home metoprolol held for soft BPs during admission. Etiology of tachycardia included sepsis (although pt hypertensive?) v PE v rebound tachycardia from beta blocker being held v ACS. 1L IVF running. Lopressor 2.5mg IV x1 given with improvement of HR to low 100s, however BP dropped to 80s/50s. IVF bolus continued with improvement of BP to 90s/50s. Basic labs, VBG, cardiac enzymes obtained. Pt medically stabilized at this time. Instructed primary team to c/w IVF resuscitations, obtain CTA chest to evaluate for PE, and consult cardiology for further evaluation of ACS and management of patient's home cardiac meds.  On arrival patient mildly somnolent but answering questions and following commands appropriately. HR 130s-140s, BP 150s/80s, afebrile rectally 97.7F, mild tachypnea, SpO2 85% on 2L NC, . NC increased to 4L w/ O2 sat 95%. EKG showed sinus tachycardia with diffuse T wave inversions unchanged from prior EKG. Pt denies any chest pain. Pt appears volume depleted. Labwork from prior AM significant for increasing leukocytosis and uremia. Pt also noted to have had home metoprolol held for soft BPs during admission. Etiology of tachycardia included sepsis (although pt hypertensive?) v PE v rebound tachycardia from beta blocker being held v ACS. 1L IVF running. Lopressor 2.5mg IV x1 given with improvement of HR to low 100s, however BP dropped to 80s/50s. IVF bolus continued with improvement of BP to 90s/50s. Basic labs, VBG, cardiac enzymes obtained. Pt medically stabilized at this time. Instructed primary team to c/w IVF resuscitations, obtain CTA chest to evaluate for PE, and consult cardiology for further evaluation of ACS and management of patient's home cardiac meds.

## 2020-12-10 NOTE — RAPID RESPONSE TEAM SUMMARY - NSSITUATIONBACKGROUNDRRT_GEN_ALL_CORE
89F history of CAD s/p 3V CABG, Dm2, HTN, Hyperlipidemia, admitted for elevated Troponin level 2/2 demand ischemia in the setting of community acquired PNA. COVID-19 infection r/o x2. Hospital course notable for signs of hypovolemia on labs due to poor appetite and oral intake, as well as worsening leukocytosis and now urinary retention. RRT called for tachycardia to 200.

## 2020-12-10 NOTE — PROGRESS NOTE ADULT - PROBLEM SELECTOR PLAN 9
- Continue Statin.
1.  Name of PCP: Andre Kearns   2.  PCP Contacted on Admission: [ ] Y    [X] N    3.  PCP contacted at Discharge: [ ] Y    [ ] N    [ ] N/A  4.  Post-Discharge Appointment Date and Location:  5.  Summary of Handoff given to PCP:
VTE with Lovenox 30mg sub cut daily.  Fall, Aspiration, safety precautios.  PT and SW eval.
VTE with Lovenox 30mg sub cut daily.  Fall, Aspiration, safety precautios.  PT and SW eval.
Anticipate d/c home with home PT once patient has improved mobility.     1.  Name of PCP: Andre Kearns   2.  PCP Contacted on Admission: [ ] Y    [X] N    3.  PCP contacted at Discharge: [ ] Y    [ ] N    [ ] N/A  4.  Post-Discharge Appointment Date and Location:  5.  Summary of Handoff given to PCP:
VTE with Lovenox 30mg sub cut daily.  Fall, Aspiration, safety precautios.  PT and SW eval.

## 2020-12-10 NOTE — PROGRESS NOTE ADULT - PROBLEM SELECTOR PROBLEM 2
Pneumonia due to infectious organism, unspecified laterality, unspecified part of lung
Supraventricular tachycardia
Pneumonia due to infectious organism, unspecified laterality, unspecified part of lung
Leukocytosis, unspecified type

## 2020-12-10 NOTE — CONSULT NOTE ADULT - SUBJECTIVE AND OBJECTIVE BOX
CONTACT INFO:  Ellis Matias MD (Resident Physician - PGY-1 - Internal Medicine)  [email or web address]  Pager: [phone] (any site) or [phone] ([facility] only)    CHIEF COMPLAINT: Generalized weakness; FTT    HPI: 89F, self ambulating, [place], history of CAD s/p 3VCABG 2005 @ [facility], HTN, Hyperlipidemia, DM2, experienced generalized weakness with intermittent Frontal HA, last felt 12/3, intermittent, atraumatic, exertional, low back pain, relived with rest, last felt  and 1 episode of urinary incontinence 12/3 evening, that prompted her to come to the ED. Pt/collateral reporting significant recent weight loss and decreased PO intake due to early satiety vs. loss of appetitte. On presentation, pt denied dizziness, falls, blurry vision, SOB, cough, chest pain, DANG, nausea, vomit, diarrhea, constipation, abdominal pain, bowel incontinence, chills, diaphoresis, dysuria, COVID exposure.     In the Ed, the pt was started on Ceftriaxone and Zithromax IV for leukocytosis and preliminary CXR revealing b/l opacities.   Trop 85> 77  EKG NSR @ 85b/ min, LVH, TWI 1,2, AVL, V4 V5, V6, Qt/ Qtc= 370/ 440. No prior EKG on file.   Vitals: T max= 98* oral, HR= 91b/min, BP = 117/ 85, RR= 17b/ min, SPo2= 100% ra   COVID ruled out via PCR.    HOSPITAL COURSE: Pt admitted to telemetry unit and is being treated for presumed community acquired PNA. RRT called on 12/10 in early AM for telemetry finding of tachycardia to 200bpm. RRT summary included EKG revealing what appears to be sinus tachycardia w/demand ischemia likely due to rate. Pt found to be afebrile; HR 130s-140s; BP 150s/80s; w/mild tachypnea and O2 sat of 85% on 2L NC that improved to 95% on 4L NC. Chart review showing home metoprolol held for soft BPs during admission. RRT team concerned for sepsis v PE v rebound tachycardia from beta blocker withdrawal v ACS. 1L IVF running. Lopressor 2.5mg IV x1 given with improvement of HR to low 100s, however BP dropped to 80s/50s. IVF bolus continued with improvement of BP to 90s/50s. CTA chest ordered evaluate for PE and cardiology for further evaluation and management of tachycardia, ACS workup, and management cardiac meds.    CONSULT ENCOUNTER: Pt recalls having large group of individuals in her room in the early AM (RRT), but denies the presence of any symptoms at the time or presently. Pt's subjective exam is suspect as she denies any history of cardiac disease, including denying having open heart surgery despite the presence of a mediastinotomy scar. She states that the scar has "always been there" and that she never had any surgeries or issues with the heart. She reports generalized weakness and subjective thirst/dehydration, but denies dyspnea at rest or exertion, weight loss, N/V, chest pain/tightness, palpitations, LE swelling. She does endorse decreased liquid and solid PO intake of late 2/2 poor appetite.      PAST MEDICAL & SURGICAL HISTORY:  Hyperlipidemia    CAD (coronary artery disease)    HTN (hypertension)    DM (diabetes mellitus)    History of coronary artery bypass, three   @ [facility].        FAMILY HISTORY:  FH: pneumonia  Father.    FH: CAD (coronary artery disease)  Mother.        SOCIAL HISTORY:  Smoking: [ ] Never Smoked [X] Former Smoker (1 packs x 25+ years; quit ~) [ ] Current Smoker  (__ packs x ___ years)  Substance Use: [X] Never Used [ ] Used ____  EtOH Use: denies  Marital Status: [ ] Single [ ]  [ ]  [X]   Occupation: [employer]   Recent Travel: Denies  Advance Directives: Full Code    Allergies    No Known Allergies    Intolerances        HOME MEDICATIONS:  Home Medications:  Aspirin Enteric Coated 325 mg oral delayed release tablet: 1 tab(s) orally once a day (07 Dec 2020 15:38)  LISINOPRIL  10 MG TABS: tab(s) orally once a day (07 Dec 2020 15:38)  METFORMIN HYDROCHLORIDE  500 MG TABS: tab(s) orally 2 times a day (07 Dec 2020 15:38)  METOPROLOL TARTRATE  50 MG TABS: tab(s) orally 2 times a day (07 Dec 2020 15:38)  SIMVASTATIN  40 MG TABS: tab(s) orally once a day (at bedtime) (07 Dec 2020 15:38)      REVIEW OF SYSTEMS:  Constitutional: [ ] negative [ ] fevers [ ] chills [X] weight loss [ ] weight gain [ ] malaise [X] fatigue  HEENT: [ X] negative [ ] visual disturbances [ ] postnasal drip [ ] nasal congestion [ ] sore throat  CV: [X] negative [ ] chest pain [ ] palpitations [ ] orthopnea   Resp: [X] negative [ ] cough [ ] shortness of breath [ ] wheezing [ ] sputum [ ] hemoptysis  GI: [ ] negative [ ] nausea [ ] vomiting [ ] abdominal pain [ ] dysphagia [ ] diarrhea [ ] melena [ ] hematochezia [X] loss of appetite   : [X] negative [ ] dysuria [ ] nocturia [ ] hematuria [ ] increased urinary frequency  Musculoskeletal: [X] negative [ ] back pain [ ] myalgias [ ] arthralgias [ ] fracture  Skin: [X] negative [ ] rash [ ] pruritus  Neurological: [ ] negative [ ] headache [ ] dizziness [ ] syncope [X] weakness [ ] numbness  Psychiatric: [X] negative [ ] anxiety [ ] depression  Endocrine: [ ] negative [X] diabetes [ ] thyroid problem  Hematologic/Lymphatic: [ ] negative [ ] anemia [ ] bleeding problem [X] easy bruising  Allergic/Immunologic: [ ] hives [ ] angioedema  [X] All other systems negative  [ ] Unable to assess ROS because ________    OBJECTIVE:  ICU Vital Signs Last 24 Hrs  T(C): 36.2 (10 Dec 2020 10:54), Max: 36.9 (09 Dec 2020 21:34)  T(F): 97.1 (10 Dec 2020 10:54), Max: 98.5 (09 Dec 2020 21:34)  HR: 108 (10 Dec 2020 10:54) (68 - 140)  BP: 99/62 (10 Dec 2020 10:54) (93/56 - 135/78)  BP(mean): --  ABP: --  ABP(mean): --  RR: 18 (10 Dec 2020 11:01) (17 - 22)  SpO2: 93% (10 Dec 2020 11:01) (92% - 99%)         @ 07:01  -  12-10 @ 07:00  --------------------------------------------------------  IN: 1330 mL / OUT: 725 mL / NET: 605 mL      CAPILLARY BLOOD GLUCOSE      POCT Blood Glucose.: 115 mg/dL (10 Dec 2020 08:41)      PHYSICAL EXAM:  GENERAL: Cachetic woman in NAD; aseptic appearing, wearing NC for supplemental O2, but delivery system not within nostrils  CHEST/LUNG: Coarse breath sounds in all auscultated fields anteriorly and posteriorly w/o overt crackles or wheezing. No stridor; breathsounds slightly decreased at BL bases  HEART: Tachycardia with normal rhythm; S1/S2 equal and appreciated; 2+/6 holosystolic murmur best appreciated in L 4th IC space; no overt rubs or gallops  ABDOMEN: Mildly distended abdomen w/o fluid wave. Hypoactive BS x4; NTTP  EXTREMITIES: Radial pulses 2+ BL; DP pulses 1+ BL; No clubbing, cyanosis, or edema  PSYCH: AAOx3, "tired" mood w/congruent and blunted affect  SKIN: Diffusely scattered ecchymoses on extremities x4 w/o active bleeding       HOSPITAL MEDICATIONS:  Standing Meds:  aspirin  chewable 81 milliGRAM(s) Oral daily  dextrose 40% Gel 15 Gram(s) Oral once  dextrose 5%. 1000 milliLiter(s) IV Continuous <Continuous>  dextrose 5%. 1000 milliLiter(s) IV Continuous <Continuous>  dextrose 50% Injectable 25 Gram(s) IV Push once  dextrose 50% Injectable 12.5 Gram(s) IV Push once  dextrose 50% Injectable 25 Gram(s) IV Push once  enoxaparin Injectable 30 milliGRAM(s) SubCutaneous daily  glucagon  Injectable 1 milliGRAM(s) IntraMuscular once  insulin lispro (ADMELOG) corrective regimen sliding scale   SubCutaneous three times a day before meals  insulin lispro (ADMELOG) corrective regimen sliding scale   SubCutaneous at bedtime  lactobacillus acidophilus 1 Tablet(s) Oral two times a day  levoFLOXacin  Tablet 750 milliGRAM(s) Oral every 48 hours  metoprolol tartrate 50 milliGRAM(s) Oral two times a day  polyethylene glycol 3350 17 Gram(s) Oral daily  senna 2 Tablet(s) Oral at bedtime  simvastatin 40 milliGRAM(s) Oral at bedtime      PRN Meds:  acetaminophen   Tablet .. 650 milliGRAM(s) Oral every 6 hours PRN      LABS:                        12.7   x     )-----------( 213      ( 10 Dec 2020 10:34 )             38.2     Hgb Trend: 12.7<--, 11.9<--, 12.0<--, 12.0<--, 12.6<--  12-10    133<L>  |  103  |  58<H>  ----------------------------<  103<H>  4.5   |  17<L>  |  1.27    Ca    9.1      10 Dec 2020 10:34  Phos  2.5     12-09  Mg     1.8     1210    TPro  4.9<L>  /  Alb  2.1<L>  /  TBili  <0.2  /  DBili  x   /  AST  17  /  ALT  14  /  AlkPhos  95  12-10    Creatinine Trend: 1.27<--, 0.98<--, 1.12<--, 1.27<--, 1.07<--, 1.15<--  PT/INR - ( 10 Dec 2020 05:34 )   PT: 12.9 sec;   INR: 1.13 ratio         PTT - ( 10 Dec 2020 05:34 )  PTT:28.6 sec  Urinalysis Basic - ( 10 Dec 2020 05:52 )    Color: Yellow / Appearance: Clear / S.021 / pH: x  Gluc: x / Ketone: Trace  / Bili: Negative / Urobili: <2 mg/dL   Blood: x / Protein: Trace / Nitrite: Negative   Leuk Esterase: Negative / RBC: <5 /HPF / WBC <5 /HPF   Sq Epi: x / Non Sq Epi: 0-5 /HPF / Bacteria: Few        Venous Blood Gas:  12-10 @ 10:34  7.29/35/40/17/63.0  VBG Lactate: 2.4  Venous Blood Gas:  12-10 @ 05:34  7.25/32/75/15/91.2  VBG Lactate: 4.1      MICROBIOLOGY:       RADIOLOGY:  ******PRELIMINARY REPORT******    ******PRELIMINARY REPORT******            EXAM:  CT ANGIO CHEST (W)AW IC        PROCEDURE DATE:  Dec 10 2020     ******PRELIMINARY REPORT******    ******PRELIMINARY REPORT******            INTERPRETATION:  No pulmonary embolism.    Left upper lobe lung mass measuring 5.5 x 3.8cm extending into the left hilum causing obliteration of the apical posterior segment/subsegmental bronchi.    Multiple bilateral masses/nodules concerning for metastatic disease.    Smallbilateral pleural effusions, right greater than left.    Hypoattenuating indeterminate liver lesions with largest measuring 1.4 x 1.2cm(2:100).    A 1.0cm pancreatic neck cystic lesion(2:122) with upstream pancreatic ductal dilation    Indeterminate 2.7 cm left adrenal mass and right adrenal gland thickening.    Multiple peritoneal/retroperitoneal nodularities consistent with carcinomatosis.    EXAM:  XR CHEST AP OR PA 1V      EXAM:  XR ABDOMEN PORTABLE URGENT 1V        PROCEDURE DATE:  Dec 10 2020         INTERPRETATION:  TIME OF EXAM: December 10, 2020 at 7:14 AM and 7:17 AM    CLINICAL INFORMATION: Abdominal distention; hypotension; coronary artery disease.    TECHNIQUE:   Portable chest and abdomen    INTERPRETATION:    Chest:  Rotation into an HERNANDEZ projection. Innumerable nodular opacities consistent with pulmonary metastases. Left upper lobe mass more discretely identified on recent chest CT is present. Layering right effusion and to a lesser extent left effusion is seen. Sternotomy wires are present but the heart is not enlarged. No pneumothorax.    ABDOMEN:  A pessary is present. There is a nonobstructive gas pattern. Faintcontrast opacification from recent chest CT. No evidence of free intraperitoneal air. Low stool burden.      COMPARISON:  Chest radiograph       IMPRESSION:  1. Extensive pulmonary metastasis likely secondary to left upper lobe neoplasm.  2. Bilateral layering pleural effusions.  3. Nonobstructive gas pattern with mild stool burden.    [X] Reviewed and interpreted by me      EKG: XXXXX CONTACT INFO:  Ellis Matias MD (Resident Physician - PGY-1 - Internal Medicine)  [email or web address]  Pager: [phone] (any site) or [phone] ([facility] only)    CHIEF COMPLAINT: Generalized weakness; FTT    HPI: 89F, self ambulating, [place], history of CAD s/p 3VCABG 2005 @ [facility], HTN, Hyperlipidemia, DM2, experienced generalized weakness with intermittent Frontal HA, last felt 12/3, intermittent, atraumatic, exertional, low back pain, relived with rest, last felt  and 1 episode of urinary incontinence 12/3 evening, that prompted her to come to the ED. Pt/collateral reporting significant recent weight loss and decreased PO intake due to early satiety vs. loss of appetitte. On presentation, pt denied dizziness, falls, blurry vision, SOB, cough, chest pain, DANG, nausea, vomit, diarrhea, constipation, abdominal pain, bowel incontinence, chills, diaphoresis, dysuria, COVID exposure.     In the Ed, the pt was started on Ceftriaxone and Zithromax IV for leukocytosis and preliminary CXR revealing b/l opacities.   Trop 85> 77  EKG NSR @ 85b/ min, LVH, TWI 1,2, AVL, V4 V5, V6, Qt/ Qtc= 370/ 440. No prior EKG on file.   Vitals: T max= 98* oral, HR= 91b/min, BP = 117/ 85, RR= 17b/ min, SPo2= 100% ra   COVID ruled out via PCR.    HOSPITAL COURSE: Pt admitted to telemetry unit and is being treated for presumed community acquired PNA. RRT called on 12/10 in early AM for telemetry finding of tachycardia to 200bpm. RRT summary included EKG revealing what appears to be sinus tachycardia w/demand ischemia likely due to rate. Pt found to be afebrile; HR 130s-140s; BP 150s/80s; w/mild tachypnea and O2 sat of 85% on 2L NC that improved to 95% on 4L NC. Chart review showing home metoprolol held for soft BPs during admission. RRT team concerned for sepsis v PE v rebound tachycardia from beta blocker withdrawal v ACS. 1L IVF running. Lopressor 2.5mg IV x1 given with improvement of HR to low 100s, however BP dropped to 80s/50s. IVF bolus continued with improvement of BP to 90s/50s. CTA chest ordered evaluate for PE and cardiology for further evaluation and management of tachycardia, ACS workup, and management cardiac meds.    CONSULT ENCOUNTER: Pt recalls having large group of individuals in her room in the early AM (RRT), but denies the presence of any symptoms at the time or presently. Pt's subjective exam is suspect as she denies any history of cardiac disease, including denying having open heart surgery despite the presence of a mediastinotomy scar. She states that the scar has "always been there" and that she never had any surgeries or issues with the heart. She reports generalized weakness and subjective thirst/dehydration, but denies dyspnea at rest or exertion, weight loss, N/V, chest pain/tightness, palpitations, LE swelling. She does endorse decreased liquid and solid PO intake of late 2/2 poor appetite.      PAST MEDICAL & SURGICAL HISTORY:  Hyperlipidemia    CAD (coronary artery disease)    HTN (hypertension)    DM (diabetes mellitus)    History of coronary artery bypass, three   @ [facility].        FAMILY HISTORY:  FH: pneumonia  Father.    FH: CAD (coronary artery disease)  Mother.        SOCIAL HISTORY:  Smoking: [ ] Never Smoked [X] Former Smoker (1 packs x 25+ years; quit ~) [ ] Current Smoker  (__ packs x ___ years)  Substance Use: [X] Never Used [ ] Used ____  EtOH Use: denies  Marital Status: [ ] Single [ ]  [ ]  [X]   Occupation: Retied   Recent Travel: Denies  Advance Directives: Full Code    Allergies    No Known Allergies    Intolerances        HOME MEDICATIONS:  Home Medications:  Aspirin Enteric Coated 325 mg oral delayed release tablet: 1 tab(s) orally once a day (07 Dec 2020 15:38)  LISINOPRIL  10 MG TABS: tab(s) orally once a day (07 Dec 2020 15:38)  METFORMIN HYDROCHLORIDE  500 MG TABS: tab(s) orally 2 times a day (07 Dec 2020 15:38)  METOPROLOL TARTRATE  50 MG TABS: tab(s) orally 2 times a day (07 Dec 2020 15:38)  SIMVASTATIN  40 MG TABS: tab(s) orally once a day (at bedtime) (07 Dec 2020 15:38)      REVIEW OF SYSTEMS:  Constitutional: [ ] negative [ ] fevers [ ] chills [X] weight loss [ ] weight gain [ ] malaise [X] fatigue  HEENT: [ X] negative [ ] visual disturbances [ ] postnasal drip [ ] nasal congestion [ ] sore throat  CV: [X] negative [ ] chest pain [ ] palpitations [ ] orthopnea   Resp: [X] negative [ ] cough [ ] shortness of breath [ ] wheezing [ ] sputum [ ] hemoptysis  GI: [ ] negative [ ] nausea [ ] vomiting [ ] abdominal pain [ ] dysphagia [ ] diarrhea [ ] melena [ ] hematochezia [X] loss of appetite   : [X] negative [ ] dysuria [ ] nocturia [ ] hematuria [ ] increased urinary frequency  Musculoskeletal: [X] negative [ ] back pain [ ] myalgias [ ] arthralgias [ ] fracture  Skin: [X] negative [ ] rash [ ] pruritus  Neurological: [ ] negative [ ] headache [ ] dizziness [ ] syncope [X] weakness [ ] numbness  Psychiatric: [X] negative [ ] anxiety [ ] depression  Endocrine: [ ] negative [X] diabetes [ ] thyroid problem  Hematologic/Lymphatic: [ ] negative [ ] anemia [ ] bleeding problem [X] easy bruising  Allergic/Immunologic: [ ] hives [ ] angioedema  [X] All other systems negative  [ ] Unable to assess ROS because ________    OBJECTIVE:  ICU Vital Signs Last 24 Hrs  T(C): 36.2 (10 Dec 2020 10:54), Max: 36.9 (09 Dec 2020 21:34)  T(F): 97.1 (10 Dec 2020 10:54), Max: 98.5 (09 Dec 2020 21:34)  HR: 108 (10 Dec 2020 10:54) (68 - 140)  BP: 99/62 (10 Dec 2020 10:54) (93/56 - 135/78)  BP(mean): --  ABP: --  ABP(mean): --  RR: 18 (10 Dec 2020 11:01) (17 - 22)  SpO2: 93% (10 Dec 2020 11:01) (92% - 99%)         @ 07:01  -  12-10 @ 07:00  --------------------------------------------------------  IN: 1330 mL / OUT: 725 mL / NET: 605 mL      CAPILLARY BLOOD GLUCOSE      POCT Blood Glucose.: 115 mg/dL (10 Dec 2020 08:41)      PHYSICAL EXAM:  GENERAL: Cachetic woman in NAD; aseptic appearing, wearing NC for supplemental O2, but delivery system not within nostrils  CHEST/LUNG: Coarse breath sounds in all auscultated fields anteriorly and posteriorly w/o overt crackles or wheezing. No stridor; breathsounds slightly decreased at BL bases  HEART: Tachycardia with normal rhythm; S1/S2 equal and appreciated; 2+/6 holosystolic murmur best appreciated in L 4th IC space; no overt rubs or gallops  ABDOMEN: Mildly distended abdomen w/o fluid wave. Hypoactive BS x4; NTTP  EXTREMITIES: Radial pulses 2+ BL; DP pulses 1+ BL; No clubbing, cyanosis, or edema  PSYCH: AAOx3, "tired" mood w/congruent and blunted affect  SKIN: Diffusely scattered ecchymoses on extremities x4 w/o active bleeding       HOSPITAL MEDICATIONS:  Standing Meds:  aspirin  chewable 81 milliGRAM(s) Oral daily  dextrose 40% Gel 15 Gram(s) Oral once  dextrose 5%. 1000 milliLiter(s) IV Continuous <Continuous>  dextrose 5%. 1000 milliLiter(s) IV Continuous <Continuous>  dextrose 50% Injectable 25 Gram(s) IV Push once  dextrose 50% Injectable 12.5 Gram(s) IV Push once  dextrose 50% Injectable 25 Gram(s) IV Push once  enoxaparin Injectable 30 milliGRAM(s) SubCutaneous daily  glucagon  Injectable 1 milliGRAM(s) IntraMuscular once  insulin lispro (ADMELOG) corrective regimen sliding scale   SubCutaneous three times a day before meals  insulin lispro (ADMELOG) corrective regimen sliding scale   SubCutaneous at bedtime  lactobacillus acidophilus 1 Tablet(s) Oral two times a day  levoFLOXacin  Tablet 750 milliGRAM(s) Oral every 48 hours  metoprolol tartrate 50 milliGRAM(s) Oral two times a day  polyethylene glycol 3350 17 Gram(s) Oral daily  senna 2 Tablet(s) Oral at bedtime  simvastatin 40 milliGRAM(s) Oral at bedtime      PRN Meds:  acetaminophen   Tablet .. 650 milliGRAM(s) Oral every 6 hours PRN      LABS:                        12.7   x     )-----------( 213      ( 10 Dec 2020 10:34 )             38.2     Hgb Trend: 12.7<--, 11.9<--, 12.0<--, 12.0<--, 12.6<--  12-10    133<L>  |  103  |  58<H>  ----------------------------<  103<H>  4.5   |  17<L>  |  1.27    Ca    9.1      10 Dec 2020 10:34  Phos  2.5     12-09  Mg     1.8     1210    TPro  4.9<L>  /  Alb  2.1<L>  /  TBili  <0.2  /  DBili  x   /  AST  17  /  ALT  14  /  AlkPhos  95  12-10    Creatinine Trend: 1.27<--, 0.98<--, 1.12<--, 1.27<--, 1.07<--, 1.15<--  PT/INR - ( 10 Dec 2020 05:34 )   PT: 12.9 sec;   INR: 1.13 ratio         PTT - ( 10 Dec 2020 05:34 )  PTT:28.6 sec  Urinalysis Basic - ( 10 Dec 2020 05:52 )    Color: Yellow / Appearance: Clear / S.021 / pH: x  Gluc: x / Ketone: Trace  / Bili: Negative / Urobili: <2 mg/dL   Blood: x / Protein: Trace / Nitrite: Negative   Leuk Esterase: Negative / RBC: <5 /HPF / WBC <5 /HPF   Sq Epi: x / Non Sq Epi: 0-5 /HPF / Bacteria: Few        Venous Blood Gas:  12-10 @ 10:34  7.29/35/40/17/63.0  VBG Lactate: 2.4  Venous Blood Gas:  12-10 @ 05:34  7.25/32/75/15/91.2  VBG Lactate: 4.1      MICROBIOLOGY:       RADIOLOGY:  ******PRELIMINARY REPORT******    ******PRELIMINARY REPORT******            EXAM:  CT ANGIO CHEST (W)AW IC        PROCEDURE DATE:  Dec 10 2020     ******PRELIMINARY REPORT******    ******PRELIMINARY REPORT******            INTERPRETATION:  No pulmonary embolism.    Left upper lobe lung mass measuring 5.5 x 3.8cm extending into the left hilum causing obliteration of the apical posterior segment/subsegmental bronchi.    Multiple bilateral masses/nodules concerning for metastatic disease.    Smallbilateral pleural effusions, right greater than left.    Hypoattenuating indeterminate liver lesions with largest measuring 1.4 x 1.2cm(2:100).    A 1.0cm pancreatic neck cystic lesion(2:122) with upstream pancreatic ductal dilation    Indeterminate 2.7 cm left adrenal mass and right adrenal gland thickening.    Multiple peritoneal/retroperitoneal nodularities consistent with carcinomatosis.    EXAM:  XR CHEST AP OR PA 1V      EXAM:  XR ABDOMEN PORTABLE URGENT 1V        PROCEDURE DATE:  Dec 10 2020         INTERPRETATION:  TIME OF EXAM: December 10, 2020 at 7:14 AM and 7:17 AM    CLINICAL INFORMATION: Abdominal distention; hypotension; coronary artery disease.    TECHNIQUE:   Portable chest and abdomen    INTERPRETATION:    Chest:  Rotation into an HERNANDEZ projection. Innumerable nodular opacities consistent with pulmonary metastases. Left upper lobe mass more discretely identified on recent chest CT is present. Layering right effusion and to a lesser extent left effusion is seen. Sternotomy wires are present but the heart is not enlarged. No pneumothorax.    ABDOMEN:  A pessary is present. There is a nonobstructive gas pattern. Faintcontrast opacification from recent chest CT. No evidence of free intraperitoneal air. Low stool burden.      COMPARISON:  Chest radiograph       IMPRESSION:  1. Extensive pulmonary metastasis likely secondary to left upper lobe neoplasm.  2. Bilateral layering pleural effusions.  3. Nonobstructive gas pattern with mild stool burden.    [X] Reviewed and interpreted by me      EK/10 - Single lead tele @0357 (lead II) - Wide complex tachycardia w/rate of 137; ST depressions in single lead likely c/w demand ischemia given age corrected max HR of ~118; evolution into ~2 minutes of sustained, monomorphic ventricular tachycardia as indicated by 180* axis flip/inversion at time of cardioacceleration. Max V. Tach rate of ~200bpm. Spontaneous resolution of V. tach after 2m w/possible fusion beats noted and subsequent return to underlying rhythm c/w initial lead II interpretation as above.     12/10 - RRT 12-lead EKG - Wide complex tachycardia w/rate of 132; mild ST depressions in leads II/aVL w/reciprocal changes in lead III. Non-specific TWI in multiple leads c/w prior EKGs on this admission. LVH on voltage criteria.

## 2020-12-11 RX ORDER — ROBINUL 0.2 MG/ML
0.2 INJECTION INTRAMUSCULAR; INTRAVENOUS EVERY 8 HOURS
Refills: 0 | Status: DISCONTINUED | OUTPATIENT
Start: 2020-12-11 | End: 2020-12-11

## 2020-12-11 RX ORDER — MORPHINE SULFATE 50 MG/1
1 CAPSULE, EXTENDED RELEASE ORAL ONCE
Refills: 0 | Status: DISCONTINUED | OUTPATIENT
Start: 2020-12-11 | End: 2020-12-11

## 2020-12-11 RX ORDER — MORPHINE SULFATE 50 MG/1
2 CAPSULE, EXTENDED RELEASE ORAL ONCE
Refills: 0 | Status: DISCONTINUED | OUTPATIENT
Start: 2020-12-11 | End: 2020-12-11

## 2020-12-11 RX ADMIN — MORPHINE SULFATE 1 MILLIGRAM(S): 50 CAPSULE, EXTENDED RELEASE ORAL at 01:51

## 2020-12-11 RX ADMIN — SODIUM CHLORIDE 500 MILLILITER(S): 9 INJECTION INTRAMUSCULAR; INTRAVENOUS; SUBCUTANEOUS at 00:00

## 2020-12-11 RX ADMIN — ROBINUL 0.2 MILLIGRAM(S): 0.2 INJECTION INTRAMUSCULAR; INTRAVENOUS at 02:04

## 2020-12-11 RX ADMIN — MORPHINE SULFATE 1 MILLIGRAM(S): 50 CAPSULE, EXTENDED RELEASE ORAL at 01:36

## 2020-12-11 RX ADMIN — MORPHINE SULFATE 1 MILLIGRAM(S): 50 CAPSULE, EXTENDED RELEASE ORAL at 01:55

## 2020-12-11 RX ADMIN — ALBUTEROL 2 PUFF(S): 90 AEROSOL, METERED ORAL at 00:48

## 2020-12-11 NOTE — PROVIDER CONTACT NOTE (OTHER) - NAME OF MD/NP/PA/DO NOTIFIED:
Carley Oneill, ACP
josh anguiano
Carley Oneill ACP
ELENITA Christianson
ELENITA Vaughan
Lisbeth Valero [facility]  [id number]
josh anguiano
ELENITA Vaughan
josh anguiano
ELENITA Vaughan
josh anguiano
ACP Eunice Merced

## 2020-12-11 NOTE — PROVIDER CONTACT NOTE (OTHER) - DATE AND TIME:
05-Dec-2020 19:09
11-Dec-2020 02:36
06-Dec-2020 17:20
06-Dec-2020 23:20
09-Dec-2020 05:49
09-Dec-2020 13:55
10-Dec-2020 00:00
10-Dec-2020 03:35
10-Dec-2020 18:04
10-Dec-2020 18:30
10-Dec-2020 19:23
10-Dec-2020 22:15
09-Dec-2020 13:35
10-Dec-2020 05:36
10-Dec-2020 13:47
10-Dec-2020 20:10
08-Dec-2020 17:01

## 2020-12-11 NOTE — PROVIDER CONTACT NOTE (OTHER) - REASON
Patient hypotensive
Patient hypotensive and tachycardic
Patient is retaining urine
Patient requiring more oxygen, presenting with confusion
Patient's blood pressure 92/48
Pt BP is 102/49 and HR is 109
patient BP post 500ml bolus
patient is short or breath and HR is sustaining in 140s
patient was hypoxic and hypotensive
Low BP
patient BP is low, post bolus of .9% NS
Bladder scan 46ml, patient has not voided during my shift
patient was satting @85% on 5L NC, switched over to 6L NC
Low urine output
low BP, need to hold metoprolol
Patient blood pressure 94/42
patient passed away

## 2020-12-11 NOTE — PROVIDER CONTACT NOTE (OTHER) - SITUATION
Bladder scan 46ml
Patient with low urine output
Patient blood pressure 94/42
patient has passed, time of death 2:36AM
Patient BP: 101/47 HR: 122, patient sean scanned 179 cc retention
Patient hypotensive
Patient is retaining urine
Patient requiring more oxygen, presenting with confusion
Patient's blood pressure 92/48, asymptomatic, patient has history of low blood pressures
Pt BP is 102/49 and HR is 109
patient BP as noted post 500ml bolus, patient was given bolus due to hypotensiveness
patient O2 was dropping on 15L on the venturi mask (85%), and patient was hypotensive, patient lungs sounded congested
patient was short of breath satting @88%, so was placed on 2L NC, patient HR was sustaining in the 140s. RRT called, refer to flowsheets for further intervention
Low BP
patient BP is low, patient received 500ml bolus of .9%NS
patient BP is below the parameters in order to give PO metoprolol
patient was satting @85% on 5L NC, was switched over to 6L NC now satting @92%

## 2020-12-11 NOTE — PROVIDER CONTACT NOTE (OTHER) - RECOMMENDATIONS
will continue to monitor, patient was placed on nonrebreather at 15L, was given 500ml bolus, was suctioned nasally for any secretions

## 2020-12-11 NOTE — DISCHARGE NOTE FOR THE EXPIRED PATIENT - HOSPITAL COURSE
89F history of CAD s/p 3V CABG, DM2, HTN, Hyperlipidemia, admitted for elevated Troponin level 2/2 demand ischemia in the setting of initially presumed community acquired PNA.  COVID-19 infection r/o x2. Pt s/p empiric abx therapy for PNA. However, hospital course notable for signs of hypovolemia, worsening leukocytosis, urinary retention and now episode of SVT s/p IV metoprolol, which ultimately is likely due to newly diagnosed metastatic disease noted on CT angio of the chest.      1. Metastatic malignant neoplasm, unspecified site.    - Incidental finding, unknown primary at this time. Multiple masses and nodules noted on CT angio of chest --> Left upper lobe lung mass measuring 5.5 x 3.8cm extending into the left hilum, bilateral lung masses/nodules, indeterminate liver lesions with largest measuring 1.4 x 1.2cm, Indeterminate 2.7 cm left adrenal mass, and carcinomatosis.  - likely etiology for presenting symptoms, persistent leukocytosis, elevated lactate and failure to thrive  - 12/11: patient made DNR/DNI and comfort measures only.    2.  Supraventricular tachycardia.     Reviewed EKG and telemetry strip. Pt initially with sinus tach with HR in 130s and went into SVT with aberrancy ~3:57am-4:01am. Pt s/p metoprolol 2.5mg IV. Pt now in sinus tach with HR in low 100s. Likely provoked by underlying malignancy.  Likely cause for elevated troponin levels causing type II MI. No need to manage as ACS at this time.  - cardiology consulted  - cardiac monitoring.       3. Urinary retention.   - New finding given decreased UOP. Likely exacerbated by newly noted peritoneal carcinomatosis and constipation. UA not indicative of acute UTI at this time.  - I&Os monitored. PRN bedside bladder scans performed  - daily bowel regimen ordered    4.  R/O Community acquired pneumonia.   - Pt empirically started on ceftriaxone/azithro --> transitioned to levofloxacin, last dose 12/9. COVID-19 swab neg x 2. However, patient likely did not have CAP but initially CXR findings were representative of underlying metastatic disease.  - no further abx were needed     5. CAD (coronary artery disease).    - aspirin 81mg daily and simvastatin 40mg ordered daily  - continued with metoprolol tartrate 50mg BID.      6.  Type 2 diabetes mellitus with hyperglycemia, without long-term current use of insulin.  - A1c 6.5 - well controlled.    7.  Essential hypertension.   - Low normal BP noted  - Stop lisinopril for now.     8. Other hyperlipidemia.     - Continue Statin.     9. Discharge planning issues.   1.  Name of PCP: Andre Kearns   2.  PCP Contacted on Admission: [ ] Y    [X] N    3.  PCP contacted at Discharge: [ ] Y    [ ] N    [ ] N/A  4.  Post-Discharge Appointment Date and Location:  5.  Summary of Handoff given to PCP:     Called to see patient for unresponsiveness. On exam, patient had no spontaneous respirations, heart sounds, and movement.  Pt did not respond to verbal or noxious stimuli. Absent heart and breath sounds for more than 1 minute. Pupils are fixed and dilated, corneal reflex was absent. Pt is DNR/DNI on comfort care    Patient pronounced dead at 02:36. Night attending notified. Next of kin/family Brenda Ponce at bedside. Autopsy declined.

## 2020-12-11 NOTE — PROVIDER CONTACT NOTE (OTHER) - ASSESSMENT
patient asymptomatic to the BP, resting in bed, on 12L venturi mask, NSR on tele
patient is asymptomatic to blood pressure, no shortness of breath or chest pain at this time
A&Ox2, NSR on tele, 6L NC, no signs of SOB or chest pain
Patient VS in flow sheet. BP 94/52. Patient asymptomatic, denies dizziness or lightheadedness. Patient seems to have poor Oral intake, not interested in eating her meals.
503B Kris- Patient has not voided all day. Bladder scan shows 46 ml. Abdomen is distended, but patient denies discomfort/pain.
Patient belly distended. Pt denies urge to urinate, denies feeling uncomfortable
patient no longer has pulse, no breath sounds, pupils not dilating
Patient blood pressure 94/42. Patient reports being thirsty, RN administered water. Patient denies chest pain, shortness of breath, headache, dizziness. Vitals as per flowsheet.
Manual BP 82/42. Vital signs in chart. Patient asymptomatic.
Patient asymptomatic, in no acute distress with no complaints at this time
Patient has not voided all shift. Patient is on IVF @ 100ml/hr. Bladder scan showed 486. Straight catherization urine output was 600ml.
Patient is alert and oriented, asymptomatic, denies chest pain, no SOB, no signs of acute distress noted.
Patient oxygen saturation 86% on 3L NC. Patient is now unaware of her surroundings and thinks she is at "your house". At baseline, patient is A&Ox4, forgetful at times, but never altered. This is a change of status for patient.
Pt is asymptomatic, resting in bed,
patient is A&Ox2; on 5L NC, NSR on tele, comfortable in bed
patient is lethargic, and restless, NSR on tele
patient was short of breath, feeling her heart racing, A&Ox3

## 2020-12-11 NOTE — PROVIDER CONTACT NOTE (OTHER) - BACKGROUND
Patient admitted for pneumonia
patient was admitted due to pneumonia, pmh of hyperlipidemia, elevated trops, CAD
Patient admitted for pneumonia
Patient urinated earlier during BM, unable to measure urine ouput.
patient admitted due to pneumonia and elevated trops, pmh of CAD and hyperlipidemia
Admitted for pneumonia, high suspicion COVID, transferred to [address] [place] [address], COVID negative x2.
89 year old female, admitted with PNA, dx HTN, CAD
89 yo female, PMH of HTN, CAD, admitted for generalized weakness, high suspicion for covid, tested negative x2.
Patient admitted for pneumonia with past hypotension assessed
Patient admitted for pneumonia.
Patient admitted for pneumonia. Patient increased oxygen requirements, patient is now confused to situation.
Patient admitted with pneumonia. S/p RRT at 4 AM on 12/10 due to SVT and desatting on room air. Patient has barely slept since 4 AM.
patient was admitted due to pneumonia and elevated troponins
patient was admitted due to pneumonia, NSR on tele, PMH of CAD, hyperlipidemia, DNR/DNI
patient was admitted due to pneumonia, has a PMH of hyperlipidemia and CAD
patient was admitted due to pneumonia, RRT called on her for tachycardia

## 2020-12-11 NOTE — PROVIDER CONTACT NOTE (OTHER) - ACTION/TREATMENT ORDERED:
will continue to monitor, patient was placed on nonrebreather at 15L, was given 500ml bolus, was suctioned nasally for any secretions. recheck BP post bolus

## 2020-12-11 NOTE — CHART NOTE - NSCHARTNOTEFT_GEN_A_CORE
Alerted by RN that patient is requiring increasing O2 demand and sounds like he is wheezing. Patient was seen and evaluated at bedside. Patient satting 88% on ventimask 15L. On ascultation patient coarse rhonchi through upper b/l lung fields likely 2/2 to pharyngeal secretions and mild wheezing. BP noted to be SBP 70s. Patient has received 1.5L fluids throughout the day. Patient's mental status has declined throughout the day. Currently she is unable to tolerate taking any PO medications, therefore midodrine is not an option. Patient's family called and MOSLT form was completed for DNR/ DNI status after speaking with daughter Brenda Ponce. Family would not like vasopressors to increase patient's BP, however they would like to proceed with PO/IV medications and antibiotics if necessary. Family is amenable to NGT but need to think about potential PEG. Case discussed with night hospitalist and agree with the following plan. Patient unable to tolerate O2 on venti, increased to NRB and patient nasopharyngeal suctioned with improvement of O2 to 95%. 500 ml bolus over 1 hour given for BP support. Half way through bolus bp increased to SBP 87. Patient also ordered for albuterol to help wheezing. Glycopyrrolate ordered to help decrease secretions. Provider spoke with family at length regarding patient's current medical status. ADN made aware and family permitted to come to the hospital to see the patient due to the patient's declining status. Will continue to monitor patient closely overnight.

## 2022-08-10 NOTE — ED PROVIDER NOTE - PHYSICAL EXAMINATION
Please review referral and advise on scheduling.      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 EOMI, oral mucosa moist, normal conjunctiva  Lung: CTAB, no respiratory distress, no wheezes/rhonchi/rales B/L, speaking in full sentences  CV: RRR, no murmurs, rubs or gallops  Abd: Slightly distended but soft and non-tender,  no guarding, no CVA tenderness  MSK: No midline spine TTP.   Neuro: No focal sensory or motor deficits, normal CN exam   Skin: Warm, well perfused, no rash  Psych: normal affect.

## 2022-11-28 NOTE — PATIENT PROFILE ADULT - NS PRO AD NO ADVANCE DIRECTIVE
Please use tylenol AND motrin at hte same time every 6 hours  Please use the afrin for no more than 3 days  Honey will help the cough as we discussed 
No

## 2023-05-04 NOTE — H&P ADULT - NSICDXPASTSURGICALHX_GEN_ALL_CORE_FT
No
PAST SURGICAL HISTORY:  History of coronary artery bypass, three 2005 @ [facility].

## 2023-09-25 RX ORDER — SIMVASTATIN 20 MG/1
0 TABLET, FILM COATED ORAL
Qty: 0 | Refills: 0 | DISCHARGE

## 2023-09-25 RX ORDER — METOPROLOL TARTRATE 50 MG
0 TABLET ORAL
Qty: 0 | Refills: 0 | DISCHARGE

## 2023-09-25 RX ORDER — METFORMIN HYDROCHLORIDE 850 MG/1
0 TABLET ORAL
Qty: 0 | Refills: 0 | DISCHARGE

## 2023-09-25 RX ORDER — ASPIRIN/CALCIUM CARB/MAGNESIUM 324 MG
1 TABLET ORAL
Qty: 0 | Refills: 0 | DISCHARGE

## 2023-09-25 RX ORDER — LISINOPRIL 2.5 MG/1
0 TABLET ORAL
Qty: 0 | Refills: 0 | DISCHARGE